# Patient Record
Sex: MALE | Race: WHITE | NOT HISPANIC OR LATINO | Employment: FULL TIME | URBAN - METROPOLITAN AREA
[De-identification: names, ages, dates, MRNs, and addresses within clinical notes are randomized per-mention and may not be internally consistent; named-entity substitution may affect disease eponyms.]

---

## 2023-05-01 PROBLEM — I10 ESSENTIAL HYPERTENSION: Status: ACTIVE | Noted: 2023-05-01

## 2023-05-01 RX ORDER — LOSARTAN POTASSIUM 25 MG/1
1 TABLET ORAL DAILY
COMMUNITY
Start: 2021-03-10 | End: 2023-10-04 | Stop reason: SDUPTHER

## 2023-05-01 RX ORDER — FLUTICASONE PROPIONATE 50 MCG
SPRAY, SUSPENSION (ML) NASAL
COMMUNITY

## 2023-05-01 RX ORDER — NAPROXEN 500 MG/1
1 TABLET ORAL
COMMUNITY
Start: 2022-04-14 | End: 2024-04-11 | Stop reason: ALTCHOICE

## 2023-09-19 DIAGNOSIS — I10 ESSENTIAL HYPERTENSION: ICD-10-CM

## 2023-09-19 RX ORDER — LOSARTAN POTASSIUM 25 MG/1
25 TABLET ORAL DAILY
Qty: 30 TABLET | Refills: 0 | Status: CANCELLED | OUTPATIENT
Start: 2023-09-19 | End: 2023-10-19

## 2023-10-04 ENCOUNTER — TELEPHONE (OUTPATIENT)
Dept: PRIMARY CARE | Facility: CLINIC | Age: 38
End: 2023-10-04
Payer: COMMERCIAL

## 2023-10-04 DIAGNOSIS — I10 ESSENTIAL HYPERTENSION: ICD-10-CM

## 2023-10-04 RX ORDER — LOSARTAN POTASSIUM 25 MG/1
25 TABLET ORAL DAILY
Qty: 7 TABLET | Refills: 0 | Status: SHIPPED | OUTPATIENT
Start: 2023-10-04 | End: 2023-10-11 | Stop reason: SDUPTHER

## 2023-10-10 NOTE — PROGRESS NOTES
"Subjective   Chief Complaint   Patient presents with    Follow-up     Dandre is here today for routine F/U and medication refill       Patient ID: Dandre Bangura is a 38 y.o. male who presents for Follow-up (Dandre is here today for routine F/U and medication refill).    HPI  Est 39 yo male presents today for f/u on HTN  LOV Nov 2022    Pt denies any acute c/o today  Requests flu vaccine and refill on Epi pen for bee sting allergy     #HTN  Rx Losartan 25 mg daily   BP dcrjn=114/83    Pt is due for FBW    Review of Systems  All 13 systems were reviewed and are within normal limits except positive and pertinent negative responses which are noted below or in HPI.    Objective   /83   Pulse 61   Ht 1.753 m (5' 9\")   Wt 83.5 kg (184 lb)   BMI 27.17 kg/m²        Physical Exam  Vitals reviewed.   Eyes:      Conjunctiva/sclera: Conjunctivae normal.   Cardiovascular:      Pulses: Normal pulses.      Heart sounds: Normal heart sounds.   Pulmonary:      Effort: Pulmonary effort is normal.      Breath sounds: Normal breath sounds.   Skin:     General: Skin is warm and dry.   Neurological:      Mental Status: He is alert.   Psychiatric:         Mood and Affect: Mood normal.         Thought Content: Thought content normal.         Assessment/Plan   Problem List Items Addressed This Visit             ICD-10-CM    Essential hypertension - Primary I10    Relevant Medications    losartan (Cozaar) 25 mg tablet    Other Relevant Orders    Basic Metabolic Panel    Lipid Panel     Other Visit Diagnoses         Codes    Need for vaccination     Z23    Relevant Orders    Flu vaccine (IIV4) age 6 months and greater, preservative free    Allergy to bee sting     Z91.030    Relevant Medications    EPINEPHrine (Epipen) 0.3 mg/0.3 mL injection syringe            "

## 2023-10-11 ENCOUNTER — LAB (OUTPATIENT)
Dept: LAB | Facility: LAB | Age: 38
End: 2023-10-11
Payer: COMMERCIAL

## 2023-10-11 ENCOUNTER — OFFICE VISIT (OUTPATIENT)
Dept: PRIMARY CARE | Facility: CLINIC | Age: 38
End: 2023-10-11
Payer: COMMERCIAL

## 2023-10-11 VITALS
WEIGHT: 184 LBS | BODY MASS INDEX: 27.25 KG/M2 | DIASTOLIC BLOOD PRESSURE: 83 MMHG | HEART RATE: 61 BPM | HEIGHT: 69 IN | SYSTOLIC BLOOD PRESSURE: 133 MMHG

## 2023-10-11 DIAGNOSIS — I10 ESSENTIAL HYPERTENSION: ICD-10-CM

## 2023-10-11 DIAGNOSIS — Z23 NEED FOR VACCINATION: ICD-10-CM

## 2023-10-11 DIAGNOSIS — Z91.030 ALLERGY TO BEE STING: ICD-10-CM

## 2023-10-11 DIAGNOSIS — I10 ESSENTIAL HYPERTENSION: Primary | ICD-10-CM

## 2023-10-11 LAB
ANION GAP SERPL CALC-SCNC: 11 MMOL/L (ref 10–20)
BUN SERPL-MCNC: 15 MG/DL (ref 6–23)
CALCIUM SERPL-MCNC: 9.5 MG/DL (ref 8.6–10.3)
CHLORIDE SERPL-SCNC: 103 MMOL/L (ref 98–107)
CHOLEST SERPL-MCNC: 231 MG/DL (ref 0–199)
CHOLESTEROL/HDL RATIO: 4.2
CO2 SERPL-SCNC: 29 MMOL/L (ref 21–32)
CREAT SERPL-MCNC: 1.09 MG/DL (ref 0.5–1.3)
GFR SERPL CREATININE-BSD FRML MDRD: 88 ML/MIN/1.73M*2
GLUCOSE SERPL-MCNC: 76 MG/DL (ref 74–99)
HDLC SERPL-MCNC: 54.7 MG/DL
LDLC SERPL CALC-MCNC: 147 MG/DL (ref 140–190)
NON HDL CHOLESTEROL: 176 MG/DL (ref 0–149)
POTASSIUM SERPL-SCNC: 4.3 MMOL/L (ref 3.5–5.3)
SODIUM SERPL-SCNC: 139 MMOL/L (ref 136–145)
TRIGL SERPL-MCNC: 145 MG/DL (ref 0–149)
VLDL: 29 MG/DL (ref 0–40)

## 2023-10-11 PROCEDURE — 3079F DIAST BP 80-89 MM HG: CPT | Performed by: NURSE PRACTITIONER

## 2023-10-11 PROCEDURE — 1036F TOBACCO NON-USER: CPT | Performed by: NURSE PRACTITIONER

## 2023-10-11 PROCEDURE — 80048 BASIC METABOLIC PNL TOTAL CA: CPT

## 2023-10-11 PROCEDURE — 3075F SYST BP GE 130 - 139MM HG: CPT | Performed by: NURSE PRACTITIONER

## 2023-10-11 PROCEDURE — 80061 LIPID PANEL: CPT

## 2023-10-11 PROCEDURE — 90686 IIV4 VACC NO PRSV 0.5 ML IM: CPT | Performed by: NURSE PRACTITIONER

## 2023-10-11 PROCEDURE — 99213 OFFICE O/P EST LOW 20 MIN: CPT | Performed by: NURSE PRACTITIONER

## 2023-10-11 PROCEDURE — 90471 IMMUNIZATION ADMIN: CPT | Performed by: NURSE PRACTITIONER

## 2023-10-11 PROCEDURE — 36415 COLL VENOUS BLD VENIPUNCTURE: CPT

## 2023-10-11 RX ORDER — EPINEPHRINE 0.3 MG/.3ML
1 INJECTION SUBCUTANEOUS ONCE AS NEEDED
Qty: 1 EACH | Refills: 0 | Status: SHIPPED | OUTPATIENT
Start: 2023-10-11

## 2023-10-11 RX ORDER — LOSARTAN POTASSIUM 25 MG/1
25 TABLET ORAL DAILY
Qty: 90 TABLET | Refills: 1 | Status: SHIPPED | OUTPATIENT
Start: 2023-10-11 | End: 2024-04-04

## 2023-10-11 NOTE — PATIENT INSTRUCTIONS
Thank you for seeing me today.  It was a pleasure to see you again!    #HTN  Your blood pressure should be </= 130/80.  Today your blood pressure was 133/83  C/W Losartan 25 mg daily    You should avoid foods that are high in sodium and saturated fats  Exercise daily for at least 30 minutes  minutes/week  Avoid stressful situations as this can increase your blood pressure  Take your medications as prescribed--do not skip doses  Obtain a BP monitor and check your blood pressure at home. Check it around the same time each day, at least 1 hour after taking your medication.  Record your blood pressure in a log and  bring your log with you to your next appointment.    Lab work ordered today.  Please have your blood drawn in the next 1-2 weeks.  You need to be FASTING for 12 hours prior to blood draw.  You may only have water.  Please contact your insurance company to ask about the best location to get blood drawn.  We will contact you with the results of your blood work and any necessary adjustments  to your plan of care, if you do not hear from us within 3-5 days of having your blood drawn, please call the office at [phone].    Flu vaccine today    RTC 6 MONTHS AND AS NEEDED

## 2024-04-04 DIAGNOSIS — I10 ESSENTIAL HYPERTENSION: ICD-10-CM

## 2024-04-04 RX ORDER — LOSARTAN POTASSIUM 25 MG/1
25 TABLET ORAL DAILY
Qty: 90 TABLET | Refills: 1 | Status: SHIPPED | OUTPATIENT
Start: 2024-04-04 | End: 2024-04-11 | Stop reason: SDUPTHER

## 2024-04-11 ENCOUNTER — OFFICE VISIT (OUTPATIENT)
Dept: PRIMARY CARE | Facility: CLINIC | Age: 39
End: 2024-04-11
Payer: COMMERCIAL

## 2024-04-11 VITALS
BODY MASS INDEX: 28.08 KG/M2 | HEART RATE: 65 BPM | WEIGHT: 189.6 LBS | HEIGHT: 69 IN | SYSTOLIC BLOOD PRESSURE: 132 MMHG | OXYGEN SATURATION: 96 % | DIASTOLIC BLOOD PRESSURE: 82 MMHG

## 2024-04-11 DIAGNOSIS — I10 ESSENTIAL HYPERTENSION: Primary | ICD-10-CM

## 2024-04-11 PROCEDURE — 99213 OFFICE O/P EST LOW 20 MIN: CPT | Performed by: NURSE PRACTITIONER

## 2024-04-11 PROCEDURE — 1036F TOBACCO NON-USER: CPT | Performed by: NURSE PRACTITIONER

## 2024-04-11 PROCEDURE — 3079F DIAST BP 80-89 MM HG: CPT | Performed by: NURSE PRACTITIONER

## 2024-04-11 PROCEDURE — 3075F SYST BP GE 130 - 139MM HG: CPT | Performed by: NURSE PRACTITIONER

## 2024-04-11 RX ORDER — LOSARTAN POTASSIUM 25 MG/1
25 TABLET ORAL DAILY
Qty: 90 TABLET | Refills: 1 | Status: SHIPPED | OUTPATIENT
Start: 2024-04-11

## 2024-04-11 NOTE — PATIENT INSTRUCTIONS
Thank you for seeing me today.  It was a pleasure to see you again!    Your blood pressure should be </= 130/80.  Today your blood pressure was 132/82  You should avoid foods that are high in sodium and saturated fats  Exercise daily for at least 30 minutes  minutes/week  Avoid stressful situations as this can increase your blood pressure  Take your medications as prescribed--do not skip doses  Obtain a BP monitor and check your blood pressure at home. Check it around the same time each day, at least 1 hour after taking your medication.  Record your blood pressure in a log and  bring your log with you to your next appointment.    RTC 6 MONTHS FOR CPE

## 2024-04-11 NOTE — PROGRESS NOTES
"Subjective   Chief Complaint   Patient presents with    Hypertension     Patient is coming in today for a follow up for his hypertension. He is currently taking Losartan 25mg daily. Patient reports no concerns. BP        Patient ID: Dandre Bangura is a 38 y.o. male who presents for Hypertension (Patient is coming in today for a follow up for his hypertension. He is currently taking Losartan 25mg daily. Patient reports no concerns. BP ).    HPI  Dandre is an est 37 yo male presenting today for 6 month f/u on HTN    Dx: HTN    Pt reports doing well  Ran a marathon last weekend    Had a small \"pimple\" on the back of his neck that he popped last week, no signs of infection on exam     Hypertension  Patient is here for follow-up of elevated blood pressure. He is exercising and is adherent to a low-salt diet. Blood pressure is well controlled at home. Cardiac symptoms: None. Patient denies chest pain, dyspnea, fatigue, and palpitations. Cardiovascular risk factors: none. Use of agents associated with hypertension: none. History of target organ damage: none.    Rx Losartan 25 mg daily   BP today: 132/82       Allergies   Allergen Reactions    Bee Venom Protein (Honey Bee) Unknown       Review of Systems  ROS was completed and all systems are negative with the exception of what was noted in the the HPI.       Objective   /82   Pulse 65   Ht 1.753 m (5' 9\")   Wt 86 kg (189 lb 9.6 oz)   SpO2 96%   BMI 28.00 kg/m²      Current Outpatient Medications   Medication Instructions    EPINEPHrine (EPIPEN) 0.3 mg, intramuscular, Once as needed, Call 911 after use.    fluticasone (Flonase Allergy Relief) 50 mcg/actuation nasal spray nasal    losartan (COZAAR) 25 mg, oral, Daily         Physical Exam  Vitals reviewed.   Cardiovascular:      Pulses: Normal pulses.      Heart sounds: Normal heart sounds.   Pulmonary:      Effort: Pulmonary effort is normal.   Neurological:      Mental Status: He is alert. "         Assessment/Plan   Problem List Items Addressed This Visit             ICD-10-CM    Essential hypertension - Primary I10     Rx Losartan 25 mg daily   BP today: 132/82  Nonsmoker  Does not check BP at home  Runner          Relevant Medications    losartan (Cozaar) 25 mg tablet

## 2024-10-16 NOTE — PROGRESS NOTES
"Subjective   Reason for Visit: Dandre Bangura is an 39 y.o. male here for a CPE     Chief Complaint   Patient presents with    Annual Exam     Dandre Bangura is a 39 y.o. male is here today for a CPE. Patient reports No questions or concerns at this time.         HPI  Dandre is a 38 yo est male presenting today for Annual CPE and 6 month f/u on HTN   LOV April 2024    Dx: HTN     Pt reports doing well today  No acute c/o  Does not want flu vaccine     #CPE   Occupation: FT AT [employer]     Do you take any herbs or supplements that were not prescribed by a doctor? MVI, VITAMIN D     Colon cancer screening: N/A due to age   PSA: N/A due to age     Fasting blood work: Due   HIV/HEP C Screening: Due   Last eye exam: 2023  Last dental Exam: 2024  Exercise: running/lifting   Mood: no concerns   Sleep: no concerns   Vaccines: UTD     #HTN  Dx'd 2022  Rx Losartan 25 mg daily  BP today: 128/78  FBW is due   Pt exercises/runs regularly     Health Maintenance Due   Topic Date Due    Yearly Adult Physical  Never done    HIV Screening  Never done    MMR Vaccines (1 of 1 - Standard series) Never done    Varicella Vaccines (1 of 2 - 13+ 2-dose series) Never done    Hepatitis C Screening  Never done    Diabetes Screening  Never done    Hepatitis B Vaccines (1 of 3 - 19+ 3-dose series) Never done    Influenza Vaccine (1) 09/01/2024    COVID-19 Vaccine (4 - 2024-25 season) 09/01/2024       Allergies   Allergen Reactions    Bee Venom Protein (Honey Bee) Unknown         Patient Care Team:  JOE Perera as PCP - General  JOE Perera as PCP - [facility]     Review of Systems  ROS was completed and all systems are negative with the exception of what was noted in the the HPI.       Objective   Vitals:  /78   Pulse 61   Ht 1.753 m (5' 9\")   Wt 85.4 kg (188 lb 3.2 oz)   SpO2 99%   BMI 27.79 kg/m²       Current Outpatient Medications   Medication Instructions    EPINEPHrine (EPIPEN) 0.3 mg, " intramuscular, Once as needed, Call 911 after use.    fluticasone (Flonase Allergy Relief) 50 mcg/actuation nasal spray Administer into affected nostril(s).    losartan (COZAAR) 25 mg, oral, Daily         Physical Exam  Vitals reviewed.   HENT:      Right Ear: Tympanic membrane normal.      Left Ear: Tympanic membrane normal.      Mouth/Throat:      Mouth: Mucous membranes are moist.   Eyes:      Conjunctiva/sclera: Conjunctivae normal.   Cardiovascular:      Pulses: Normal pulses.      Heart sounds: Normal heart sounds.   Pulmonary:      Effort: Pulmonary effort is normal.      Breath sounds: Normal breath sounds.   Abdominal:      General: Bowel sounds are normal.   Musculoskeletal:         General: Normal range of motion.   Skin:     General: Skin is warm and dry.   Neurological:      Mental Status: He is alert and oriented to person, place, and time.   Psychiatric:         Mood and Affect: Mood normal.           Assessment & Plan  Annual physical exam  - Counseled on healthy diet and regular exercise  - Fall avoidance information provided  - Personalized prevention plan provided   - Vaccines UTD   - FBW  ordered   - Pt agreeable to HIV/HEP C screening tests        Essential hypertension  Stable today: 128/78  Continue losartan 25 mg daily  FBW ordered    Orders:    Comprehensive Metabolic Panel; Future    Lipid Panel; Future    losartan (Cozaar) 25 mg tablet; Take 1 tablet (25 mg) by mouth once daily.    Vitamin D deficiency  Check vitamin D level     Orders:    Vitamin D 25-Hydroxy,Total (for eval of Vitamin D levels); Future    Encounter for hepatitis C screening test for low risk patient    Orders:    Hepatitis C Antibody; Future    Screening for HIV without presence of risk factors    Orders:    HIV 1/2 Antigen/Antibody Screen with Reflex to Confirmation; Future    Flu vaccine refused

## 2024-10-17 ENCOUNTER — APPOINTMENT (OUTPATIENT)
Dept: PRIMARY CARE | Facility: CLINIC | Age: 39
End: 2024-10-17
Payer: COMMERCIAL

## 2024-10-17 VITALS
DIASTOLIC BLOOD PRESSURE: 78 MMHG | HEART RATE: 61 BPM | HEIGHT: 69 IN | OXYGEN SATURATION: 99 % | WEIGHT: 188.2 LBS | BODY MASS INDEX: 27.88 KG/M2 | SYSTOLIC BLOOD PRESSURE: 128 MMHG

## 2024-10-17 DIAGNOSIS — I10 ESSENTIAL HYPERTENSION: ICD-10-CM

## 2024-10-17 DIAGNOSIS — Z11.59 ENCOUNTER FOR HEPATITIS C SCREENING TEST FOR LOW RISK PATIENT: ICD-10-CM

## 2024-10-17 DIAGNOSIS — E55.9 VITAMIN D DEFICIENCY: ICD-10-CM

## 2024-10-17 DIAGNOSIS — Z00.00 ANNUAL PHYSICAL EXAM: Primary | ICD-10-CM

## 2024-10-17 DIAGNOSIS — Z11.4 SCREENING FOR HIV WITHOUT PRESENCE OF RISK FACTORS: ICD-10-CM

## 2024-10-17 DIAGNOSIS — Z28.21 FLU VACCINE REFUSED: ICD-10-CM

## 2024-10-17 PROCEDURE — 3008F BODY MASS INDEX DOCD: CPT | Performed by: NURSE PRACTITIONER

## 2024-10-17 PROCEDURE — 3078F DIAST BP <80 MM HG: CPT | Performed by: NURSE PRACTITIONER

## 2024-10-17 PROCEDURE — 99396 PREV VISIT EST AGE 40-64: CPT | Performed by: NURSE PRACTITIONER

## 2024-10-17 PROCEDURE — 3074F SYST BP LT 130 MM HG: CPT | Performed by: NURSE PRACTITIONER

## 2024-10-17 PROCEDURE — 1036F TOBACCO NON-USER: CPT | Performed by: NURSE PRACTITIONER

## 2024-10-17 RX ORDER — LOSARTAN POTASSIUM 25 MG/1
25 TABLET ORAL DAILY
Qty: 90 TABLET | Refills: 3 | Status: SHIPPED | OUTPATIENT
Start: 2024-10-17

## 2024-10-17 NOTE — ASSESSMENT & PLAN NOTE
- Counseled on healthy diet and regular exercise  - Fall avoidance information provided  - Personalized prevention plan provided   - Vaccines UTD   - FBW  ordered   - Pt agreeable to HIV/HEP C screening tests

## 2024-10-17 NOTE — PATIENT INSTRUCTIONS
Thank you for seeing me today Dandre Bangura, it was a pleasure to see you again!    Today we did your Annual Physical Exam and discussed the following:     Continue medication as prescribed    Lab work ordered today.  Please have your blood drawn in the next 1-2 weeks.  You need to be FASTING for 12 hours prior to blood draw.  You may only have water.  Please contact your insurance company to ask about the best location to get blood drawn.  We will contact you with the results of your blood work and any necessary adjustments  to your plan of care, if you do not hear from us within 3-5 days of having your blood drawn, please call the office at [phone].    For assistance with scheduling referrals or consultations, please call [phone] or [phone].    For laboratory, radiology, and other tests, please call [phone] ([phone] for pediatrics).   If you do not get results within 7-10 days, or you have any questions or concerns, please send a message, call the office ([phone]), or return to the office for a follow-up appointment.     For acute/sick visits, if you are unable to get an office visit, you can do a  On Demand Virtual Visit that is accessible via your My Chart account.  For emergencies, call 9-1-1 or go to the nearest Emergency Department.     Please schedule additional appointment(s) to address concern(s) not addressed today.    Please review prescription inserts and published information for possible adverse effects of all medications.     In general, results are discussed over the phone or via  [employer].     You can see your health information, review clinical summaries from office visits & test results online when you follow your health with MY  Chart, a personal health record.   To sign up go to [email or web address].   If you need assistance with signing up or trouble getting into your account call getbetter! Patient Line 24/7 at [phone]     [facility] ANNUALLY AND AS  NEEDED     Have a nice day!  Nieves Black

## 2024-10-17 NOTE — ASSESSMENT & PLAN NOTE
Check vitamin D level     Orders:    Vitamin D 25-Hydroxy,Total (for eval of Vitamin D levels); Future

## 2024-10-17 NOTE — ASSESSMENT & PLAN NOTE
Stable today: 128/78  Continue losartan 25 mg daily  FBW ordered    Orders:    Comprehensive Metabolic Panel; Future    Lipid Panel; Future    losartan (Cozaar) 25 mg tablet; Take 1 tablet (25 mg) by mouth once daily.

## 2025-01-17 ENCOUNTER — HOSPITAL ENCOUNTER (OUTPATIENT)
Dept: RADIOLOGY | Facility: HOSPITAL | Age: 40
Discharge: HOME | End: 2025-01-17
Payer: COMMERCIAL

## 2025-01-17 ENCOUNTER — OFFICE VISIT (OUTPATIENT)
Dept: ORTHOPEDIC SURGERY | Facility: HOSPITAL | Age: 40
End: 2025-01-17
Payer: COMMERCIAL

## 2025-01-17 DIAGNOSIS — M25.811 SHOULDER IMPINGEMENT, RIGHT: ICD-10-CM

## 2025-01-17 DIAGNOSIS — M24.111 LABRAL TEAR OF SHOULDER, DEGENERATIVE, RIGHT: Primary | ICD-10-CM

## 2025-01-17 DIAGNOSIS — M75.81 RIGHT ROTATOR CUFF TENDONITIS: ICD-10-CM

## 2025-01-17 DIAGNOSIS — M75.21 BICEPS TENDONITIS ON RIGHT: ICD-10-CM

## 2025-01-17 PROCEDURE — 99214 OFFICE O/P EST MOD 30 MIN: CPT | Performed by: FAMILY MEDICINE

## 2025-01-17 PROCEDURE — 73030 X-RAY EXAM OF SHOULDER: CPT | Mod: RT

## 2025-01-17 RX ORDER — MELOXICAM 15 MG/1
15 TABLET ORAL DAILY
Qty: 30 TABLET | Refills: 1 | Status: SHIPPED | OUTPATIENT
Start: 2025-01-17 | End: 2025-03-18
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
## 2025-02-03 ENCOUNTER — HOSPITAL ENCOUNTER (OUTPATIENT)
Dept: RADIOLOGY | Facility: CLINIC | Age: 40
Discharge: HOME | End: 2025-02-03
Payer: COMMERCIAL

## 2025-02-03 DIAGNOSIS — M75.21 BICEPS TENDONITIS ON RIGHT: ICD-10-CM

## 2025-02-03 DIAGNOSIS — M24.111 LABRAL TEAR OF SHOULDER, DEGENERATIVE, RIGHT: ICD-10-CM

## 2025-02-03 DIAGNOSIS — M25.811 SHOULDER IMPINGEMENT, RIGHT: ICD-10-CM

## 2025-02-03 DIAGNOSIS — M75.81 RIGHT ROTATOR CUFF TENDONITIS: ICD-10-CM

## 2025-02-03 PROCEDURE — 73221 MRI JOINT UPR EXTREM W/O DYE: CPT | Mod: RT

## 2025-02-03 PROCEDURE — 73221 MRI JOINT UPR EXTREM W/O DYE: CPT | Mod: RIGHT SIDE | Performed by: RADIOLOGY

## 2025-02-07 ENCOUNTER — OFFICE VISIT (OUTPATIENT)
Dept: ORTHOPEDIC SURGERY | Facility: HOSPITAL | Age: 40
End: 2025-02-07
Payer: COMMERCIAL

## 2025-02-07 DIAGNOSIS — S46.011A TRAUMATIC INCOMPLETE TEAR OF RIGHT ROTATOR CUFF, INITIAL ENCOUNTER: Primary | ICD-10-CM

## 2025-02-07 PROCEDURE — 99213 OFFICE O/P EST LOW 20 MIN: CPT | Performed by: FAMILY MEDICINE
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
## 2025-02-28 ENCOUNTER — OFFICE VISIT (OUTPATIENT)
Dept: ORTHOPEDIC SURGERY | Facility: HOSPITAL | Age: 40
End: 2025-02-28
Payer: COMMERCIAL

## 2025-02-28 DIAGNOSIS — S46.011A TRAUMATIC INCOMPLETE TEAR OF RIGHT ROTATOR CUFF, INITIAL ENCOUNTER: ICD-10-CM

## 2025-02-28 PROCEDURE — 99204 OFFICE O/P NEW MOD 45 MIN: CPT | Performed by: STUDENT IN AN ORGANIZED HEALTH CARE EDUCATION/TRAINING PROGRAM

## 2025-02-28 PROCEDURE — L3670 SO ACRO/CLAV CAN WEB PRE OTS: HCPCS | Performed by: STUDENT IN AN ORGANIZED HEALTH CARE EDUCATION/TRAINING PROGRAM

## 2025-02-28 PROCEDURE — 99214 OFFICE O/P EST MOD 30 MIN: CPT | Performed by: STUDENT IN AN ORGANIZED HEALTH CARE EDUCATION/TRAINING PROGRAM
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
## 2025-03-12 ENCOUNTER — PREP FOR PROCEDURE (OUTPATIENT)
Dept: ORTHOPEDIC SURGERY | Facility: CLINIC | Age: 40
End: 2025-03-12
Payer: COMMERCIAL

## 2025-03-12 DIAGNOSIS — M75.41 SUBACROMIAL IMPINGEMENT OF RIGHT SHOULDER: ICD-10-CM

## 2025-03-12 DIAGNOSIS — S46.011A TRAUMATIC COMPLETE TEAR OF RIGHT ROTATOR CUFF, INITIAL ENCOUNTER: Primary | ICD-10-CM

## 2025-03-12 DIAGNOSIS — M75.21 BICEPS TENDINITIS OF RIGHT SHOULDER: ICD-10-CM

## 2025-03-15 DIAGNOSIS — M25.811 SHOULDER IMPINGEMENT, RIGHT: ICD-10-CM

## 2025-03-15 DIAGNOSIS — M75.21 BICEPS TENDONITIS ON RIGHT: ICD-10-CM

## 2025-03-15 DIAGNOSIS — M75.81 RIGHT ROTATOR CUFF TENDONITIS: ICD-10-CM

## 2025-03-15 DIAGNOSIS — M24.111 LABRAL TEAR OF SHOULDER, DEGENERATIVE, RIGHT: ICD-10-CM

## 2025-03-17 ENCOUNTER — LAB (OUTPATIENT)
Dept: LAB | Facility: HOSPITAL | Age: 40
End: 2025-03-17
Payer: COMMERCIAL

## 2025-03-17 ENCOUNTER — PRE-ADMISSION TESTING (OUTPATIENT)
Dept: PREADMISSION TESTING | Facility: HOSPITAL | Age: 40
End: 2025-03-17
Payer: COMMERCIAL

## 2025-03-17 VITALS
HEIGHT: 69 IN | HEART RATE: 66 BPM | SYSTOLIC BLOOD PRESSURE: 144 MMHG | TEMPERATURE: 98.2 F | WEIGHT: 197.31 LBS | BODY MASS INDEX: 29.22 KG/M2 | OXYGEN SATURATION: 100 % | RESPIRATION RATE: 18 BRPM | DIASTOLIC BLOOD PRESSURE: 93 MMHG

## 2025-03-17 DIAGNOSIS — Z01.818 PREOPERATIVE TESTING: Primary | ICD-10-CM

## 2025-03-17 DIAGNOSIS — Z01.818 ENCOUNTER FOR OTHER PREPROCEDURAL EXAMINATION: Primary | ICD-10-CM

## 2025-03-17 LAB
ANION GAP SERPL CALCULATED.3IONS-SCNC: 10 MMOL/L (ref 10–20)
BUN SERPL-MCNC: 14 MG/DL (ref 6–23)
CALCIUM SERPL-MCNC: 9.4 MG/DL (ref 8.6–10.3)
CHLORIDE SERPL-SCNC: 105 MMOL/L (ref 98–107)
CO2 SERPL-SCNC: 28 MMOL/L (ref 21–32)
CREAT SERPL-MCNC: 1.07 MG/DL (ref 0.5–1.3)
EGFRCR SERPLBLD CKD-EPI 2021: 89 ML/MIN/1.73M*2
ERYTHROCYTE [DISTWIDTH] IN BLOOD BY AUTOMATED COUNT: 12.7 % (ref 11.5–14.5)
GLUCOSE SERPL-MCNC: 88 MG/DL (ref 74–99)
HCT VFR BLD AUTO: 41 % (ref 41–52)
HGB BLD-MCNC: 14.2 G/DL (ref 13.5–17.5)
MCH RBC QN AUTO: 30.5 PG (ref 26–34)
MCHC RBC AUTO-ENTMCNC: 34.6 G/DL (ref 32–36)
MCV RBC AUTO: 88 FL (ref 80–100)
NRBC BLD-RTO: 0 /100 WBCS (ref 0–0)
PLATELET # BLD AUTO: 200 X10*3/UL (ref 150–450)
POTASSIUM SERPL-SCNC: 4.4 MMOL/L (ref 3.5–5.3)
RBC # BLD AUTO: 4.65 X10*6/UL (ref 4.5–5.9)
SODIUM SERPL-SCNC: 139 MMOL/L (ref 136–145)
WBC # BLD AUTO: 5.1 X10*3/UL (ref 4.4–11.3)

## 2025-03-17 PROCEDURE — 99204 OFFICE O/P NEW MOD 45 MIN: CPT | Performed by: NURSE PRACTITIONER

## 2025-03-17 PROCEDURE — 87081 CULTURE SCREEN ONLY: CPT | Mod: WESLAB

## 2025-03-17 PROCEDURE — 80048 BASIC METABOLIC PNL TOTAL CA: CPT

## 2025-03-17 PROCEDURE — 85027 COMPLETE CBC AUTOMATED: CPT

## 2025-03-17 RX ORDER — CHLORHEXIDINE GLUCONATE ORAL RINSE 1.2 MG/ML
SOLUTION DENTAL
Qty: 473 ML | Refills: 0 | Status: SHIPPED | OUTPATIENT
Start: 2025-03-17

## 2025-03-17 RX ORDER — MELOXICAM 15 MG/1
15 TABLET ORAL DAILY
Qty: 30 TABLET | Refills: 1 | Status: SHIPPED | OUTPATIENT
Start: 2025-03-17

## 2025-03-17 ASSESSMENT — DUKE ACTIVITY SCORE INDEX (DASI)
CAN YOU DO LIGHT WORK AROUND THE HOUSE LIKE DUSTING OR WASHING DISHES: YES
CAN YOU DO YARD WORK LIKE RAKING LEAVES, WEEDING OR PUSHING A MOWER: YES
CAN YOU CLIMB A FLIGHT OF STAIRS OR WALK UP A HILL: YES
CAN YOU DO MODERATE WORK AROUND THE HOUSE LIKE VACUUMING, SWEEPING FLOORS OR CARRYING GROCERIES: YES
CAN YOU PARTICIPATE IN STRENOUS SPORTS LIKE SWIMMING, SINGLES TENNIS, FOOTBALL, BASKETBALL, OR SKIING: YES
CAN YOU PARTICIPATE IN MODERATE RECREATIONAL ACTIVITIES LIKE GOLF, BOWLING, DANCING, DOUBLES TENNIS OR THROWING A BASEBALL OR FOOTBALL: YES
DASI METS SCORE: 9.9
CAN YOU WALK INDOORS, SUCH AS AROUND YOUR HOUSE: YES
CAN YOU TAKE CARE OF YOURSELF (EAT, DRESS, BATHE, OR USE TOILET): YES
CAN YOU RUN A SHORT DISTANCE: YES
CAN YOU DO HEAVY WORK AROUND THE HOUSE LIKE SCRUBBING FLOORS OR LIFTING AND MOVING HEAVY FURNITURE: YES
CAN YOU WALK A BLOCK OR TWO ON LEVEL GROUND: YES
TOTAL_SCORE: 58.2
CAN YOU HAVE SEXUAL RELATIONS: YES

## 2025-03-17 ASSESSMENT — ENCOUNTER SYMPTOMS
NEUROLOGICAL NEGATIVE: 1
GASTROINTESTINAL NEGATIVE: 1
ACTIVITY CHANGE: 1
CARDIOVASCULAR NEGATIVE: 1
EYES NEGATIVE: 1
RESPIRATORY NEGATIVE: 1

## 2025-03-17 ASSESSMENT — PAIN SCALES - GENERAL: PAINLEVEL_OUTOF10: 2

## 2025-03-17 ASSESSMENT — PAIN DESCRIPTION - DESCRIPTORS: DESCRIPTORS: ACHING

## 2025-03-17 ASSESSMENT — PAIN - FUNCTIONAL ASSESSMENT: PAIN_FUNCTIONAL_ASSESSMENT: 0-10

## 2025-03-17 NOTE — PREPROCEDURE INSTRUCTIONS
Medication List            Accurate as of March 17, 2025  7:55 AM. Always use your most recent med list.                chlorhexidine 0.12 % solution  Commonly known as: Peridex  Use cap to measure 15 mL.  Swish/gargle mouthwash for at least 30 seconds.  Do not swallow.  Use night before surgery after brushing teeth and morning of surgery after brushing teeth.  Medication Adjustments for Surgery: Take/Use as prescribed     EPINEPHrine 0.3 mg/0.3 mL injection syringe  Commonly known as: Epipen  Inject 0.3 mL (0.3 mg) into the shoulder, thigh, or buttocks 1 time if needed for anaphylaxis for up to 1 dose. Call 911 after use.  Medication Adjustments for Surgery: Take on the morning of surgery     Flonase Allergy Relief 50 mcg/actuation nasal spray  Generic drug: fluticasone  Medication Adjustments for Surgery: Take on the morning of surgery     losartan 25 mg tablet  Commonly known as: Cozaar  Take 1 tablet (25 mg) by mouth once daily.  Medication Adjustments for Surgery: Take last dose 1 day (24 hours) before surgery     meloxicam 15 mg tablet  Commonly known as: Mobic  Take 1 tablet (15 mg) by mouth once daily.  Additional Medication Adjustments for Surgery: Take last dose 7 days before surgery                              NPO Instructions:    Do not eat any food after midnight the night before your surgery/procedure.  You may have clear liquids until TWO hours before surgery/procedure. This includes water, black tea/coffee, (no milk or cream) apple juice and electrolyte drinks (Gatorade).    Additional Instructions:      Home Preoperative Antibacterial Shower    What is a home preoperative antibacterial shower?  This shower is a way of cleaning the skin with a germ killing solution before surgery. The solution contains chlorhexidine, commonly known as CHG. CHG is a skin cleanser with germ killing ability. Let your doctor know if you are allergic to chlorhexidine.      Why do I need to take a preoperative  antibacterial shower?  Skin is not sterile. It is best to try to make your skin as free of germs as possible before surgery. Proper cleansing with a germ killing soap before surgery can lower the number of germs on your skin. This helps to reduce the risk of infection at the surgical site. Following the instructions listed below will help you prepare your skin for surgery.    How do I use the solution?      Steps: Begin using your CHG soap 6 DAYS BEFORE your scheduled surgery on __________________________________________________.  First, wash and rinse your hair using the CHG soap. Keep CHG away soap away from ear canals and eyes.  Rinse completely, do not condition. Hair extensions should be removed.  Wash your face with your normal soap and rinse.  Apply the CHG solution to a clean wet washcloth. Turn the water off or move away from the water spray to avoid premature rinsing of the CHG soap as you are applying.  Firmly lather your entire body from neck down. Do not use on face.  Pay special attention to the areas(s) where your incision(s) will be located unless they are on your face.  Avoid scrubbing your skin too hard.  The important point is to have the CHG soap sit on your skin for 3 minutes.  DO NOT wash with regular soap after you have used the CHG soap solution.  Pat yourself dry with a clean, freshly laundered towel.  DO NOT apply powders, deodorants or lotions.  Dress in clean, freshly laundered night clothes.  Be sure to sleep with clean, freshly laundered sheets.  Be aware that CHG will cause stains on fabrics; if you wash them with bleach after use. Rinse your washcloth and other linens that have contact with CHG completely. Use only non-chlorine detergents to launder the items used.  The morning of surgery is the fifth day. Repeat the above steps and dress in clean comfortable clothing.      Who should I call if I have any questions regarding the use of CHG soap?  Call the [facility]  [facility] Per[facility] at [phone] if you have any questions. Patient Information: Oral/Dental Rinse    What is oral/dental rinse?  It is a mouthwash. It is a way of cleaning the mouth with a germ killing solution before your surgery. The solution contains chlorhexidine, commonly know as CHG.  It is used inside the mouth to kill bacteria known as Staphylococcus aureus.  Let your doctor know if you are allergic to chlorhexidine.    Why do I need to use CHG oral/dental rinse?  The CHG oral/dental rinse helps to kill bacteria in your mouth known as Staphylococcus aureus. This reduces the risk of infection at the surgical site.    Using your CHG oral/dental rinse.  STEPS: use your CHG oral/dental rinse after you brush your teeth the night before (at bedtime) and the morning of your surgery. Follow the directions on your prescription label.  Use the cap on the container to measure 15ml (fill cap to fill line)  Swish (gargle if you can) the mouthwash in your mouth for at least 30 seconds, (do not swallow) spit out.  After you use your CHG rinse, do not rinse your mouth with water, drink or eat. Please refer to prescription label for the appropriate time to resume oral intake.    What side effects might I have using the CHG oral/dental rinse?  CHG rinse will stick to the plaque on the teeth. Brush and floss just before use. Teeth brushing will help avoid staining of plaque during use.    Who should I contact if I have questions about the CHG oral/dental rinse?  Please call [facility], Center for Perioperative Medicine at [phone] if you have any questions.PAT DISCHARGE INSTRUCTIONS    Please call the Same Day Surgery (SDS) Department of the hospital where your procedure will be performed after 2:00 PM the day before your surgery. If you are scheduled on a Monday, or a Tuesday following a Monday holiday, you will need to call on the last  business day prior to your surgery.    [facility]  [address] [place] [address] [phone]  [facility]  [address] [facility], [id number] [phone]  [facility]  [address] [place] [address] [phone]    Please let your surgeon know if:      You develop any open sores, shingles, burning or painful urination as these may increase your risk of an infection.   You no longer wish to have the surgery.   Any other personal circumstances change that may lead to the need to cancel or defer this surgery-such as being sick or getting admitted to any hospital within one week of your planned procedure.    Your contact details change, such as a change of address or phone number.    Starting now:     Please DO NOT drink alcohol or smoke for 24 hours before surgery. It is well known that quitting smoking can make a huge difference to your health and recovery from surgery. The longer you abstain from smoking, the better your chances of a healthy recovery. If you need help with quitting, call 1-800-QUIT-NOW to be connected to a trained counselor who will discuss the best methods to help you quit.     Before your surgery:    Please stop all supplements 7 days prior to surgery. Or as directed by your surgeon.   Please stop taking NSAID pain medicine such as Advil and Motrin 7 days before surgery.    If you develop any fever, cough, cold, rashes, cuts, scratches, scrapes, urinary symptoms or infection anywhere on your body (including teeth and gums) prior to surgery, please call your surgeon’s office as soon as possible. This may require treatment to reduce the chance of cancellation on the day of surgery.    The day before your surgery:   DIET- Please follow the diet instructions at the top of your packet.   Get a good night’s rest.  Use the special soap for  bathing if you have been instructed to use one.    Scheduled surgery times may change and you will be notified if this occurs - please check your personal voicemail for any updates.     On the morning of surgery:   Wear comfortable, loose fitting clothes which open in the front. Please do not wear moisturizers, creams, lotions, makeup or perfume.    Please bring with you to surgery:   Photo ID and insurance card   Current list of medicines and allergies   Pacemaker/ Defibrillator/Heart stent cards   CPAP machine and mask    Slings/ splints/ crutches   A copy of your complete advanced directive/DHPOA.    Please do NOT bring with you to surgery:   All jewelry and valuables should be left at home.   Prosthetic devices such as contact lenses, hearing aids, dentures, eyelash extensions, hairpins and body piercings must be removed prior to going in to the surgical suite.    After outpatient surgery:   A responsible adult MUST accompany you at the time of discharge and stay with you for 24 hours after your surgery. You may NOT drive yourself home after surgery.    Do not drive, operate machinery, make critical decisions or do activities that require co-ordination or balance until after a night’s sleep.   Do not drink alcoholic beverages for 24 hours.   Instructions for resuming your medications will be provided by your surgeon.    CALL YOUR DOCTOR AFTER SURGERY IF YOU HAVE:     Chills and/or a fever of 101° F or higher.    Redness, swelling, pus or drainage from your surgical wound or a bad smell from the wound.    Lightheadedness, fainting or confusion.    Persistent vomiting (throwing up) and are not able to eat or drink for 12 hours.    Three or more loose, watery bowel movements in 24 hours (diarrhea).   Difficulty or pain while urinating( after non-urological surgery)    Pain and swelling in your legs, especially if it is only on one side.    Difficulty breathing or are breathing faster than normal.    Any new  concerning symptoms.

## 2025-03-17 NOTE — H&P (VIEW-ONLY)
CPM/PAT Evaluation       Name: Dandre Bangura (Dandre Bangura)  /Age: 1985/39 y.o.     In-Person       Chief Complaint: Traumatic complete tear of right rotator cuff, right shoulder subacromial impingement, right shoulder biceps tendinitis     HPI  A 39-year-old male with traumatic complete tear of right rotator cuff, right shoulder subacromial impingement, right shoulder biceps tendinitis.  History of right shoulder soreness over the years s/p previous right shoulder injury and surgery in .  Right shoulder pain, weakness, and decreased range of motion has become more severe in the past several months after splitting wood.  He has had progressive symptoms of right shoulder pain and weakness especially with reaching or lifting interfering with physical activities/exercises especially with weight training.  An MRI of the right shoulder on 2/3/2025 showed:  Full-thickness supraspinatus tendon tear anterior tendon from the footprint measuring 7 mm in the AP dimension with retracted free edge of the tendon measuring 6 mm from the footprint, Mild -to -moderate glenohumeral joint osteoarthritis with  subchondral cystic change and osteophytosis demonstrated as described above with thinning of the articular cartilage in particular posterior osseous glenoid. There is a truncated appearance of the posterior glenoid labrum, Mild subacromial subdeltoid bursitis.Denies fever, chills, chest pain, shortness of breath, syncope, or right upper extremity numbness.  He is scheduled for right rotator cuff repair, right subacromial decompression, right subpectoral biceps tenodesis.        Past Medical History:   Diagnosis Date    Cutaneous abscess of back (any part, except buttock) 2020    Back abscess    Pain in unspecified knee     Knee pain    Personal history of diseases of the skin and subcutaneous tissue 2020    History of sebaceous cyst    Strain of unspecified muscle(s) and tendon(s) at lower leg  level, left leg, initial encounter 08/28/2020    Knee strain, left, initial encounter       Past Surgical History:   Procedure Laterality Date    ROTATOR CUFF REPAIR Bilateral     2002/2003         Allergies   Allergen Reactions    Bee Venom Protein (Honey Bee) Unknown       Current Outpatient Medications   Medication Sig Dispense Refill    EPINEPHrine (Epipen) 0.3 mg/0.3 mL injection syringe Inject 0.3 mL (0.3 mg) into the shoulder, thigh, or buttocks 1 time if needed for anaphylaxis for up to 1 dose. Call 911 after use. 1 each 0    fluticasone (Flonase Allergy Relief) 50 mcg/actuation nasal spray Administer into affected nostril(s).      losartan (Cozaar) 25 mg tablet Take 1 tablet (25 mg) by mouth once daily. 90 tablet 3    meloxicam (Mobic) 15 mg tablet Take 1 tablet (15 mg) by mouth once daily. 30 tablet 1     No current facility-administered medications for this visit.          Review of Systems   Constitutional:  Positive for activity change.   HENT: Negative.     Eyes: Negative.    Respiratory: Negative.     Cardiovascular: Negative.    Gastrointestinal: Negative.    Genitourinary: Negative.    Musculoskeletal:         Right shoulder pain, weakness and decreased range of motion   Skin: Negative.    Neurological: Negative.         Physical Exam  Vitals reviewed: Elevated BP-patient advised to monitor BP, take antihypertensive medications as prescribed, follow-up with PCP.   HENT:      Head: Normocephalic and atraumatic.      Mouth/Throat:      Mouth: Mucous membranes are moist.   Eyes:      Pupils: Pupils are equal, round, and reactive to light.   Cardiovascular:      Rate and Rhythm: Normal rate and regular rhythm.   Pulmonary:      Effort: Pulmonary effort is normal.      Breath sounds: Normal breath sounds.   Abdominal:      Palpations: Abdomen is soft.   Musculoskeletal:      Cervical back: Normal range of motion.      Comments: Right shoulder pain and decreased range of motion   Skin:     General: Skin  "is warm and dry.   Neurological:      Mental Status: He is alert and oriented to person, place, and time.   Psychiatric:         Mood and Affect: Mood normal.          PAT AIRWAY:   Airway:     Mallampati::  II    Neck ROM::  Full  normal        BP (!) 144/93   Pulse 66   Temp 36.8 °C (98.2 °F) (Temporal)   Resp 18   Ht 1.753 m (5' 9\")   Wt 89.5 kg (197 lb 5 oz)   SpO2 100%   BMI 29.14 kg/m²       Stop Bang Score 2   CHADS: 2.8%  DASI risk score: 58.2  METS: 9.9  SAMREEN: 0.1%  RCRI:0.4%  ASA: II  ARISCAT:1.6% score 16  PAT orders CBC, BMP, MRSA    Assessment and Plan:     Traumatic complete tear of right rotator cuff, right shoulder subacromial impingement, right shoulder biceps tendinitis Plan: Right rotator cuff repair, right subacromial decompression, right subpectoral biceps tenodesis  Hypertension managed with losartan  Joint pain taking meloxicam  BMI 27.79        "

## 2025-03-18 LAB — STAPHYLOCOCCUS SPEC CULT: NORMAL

## 2025-03-26 DIAGNOSIS — Z98.890 S/P RIGHT ROTATOR CUFF REPAIR: ICD-10-CM

## 2025-03-31 ENCOUNTER — ANESTHESIA (OUTPATIENT)
Dept: OPERATING ROOM | Facility: HOSPITAL | Age: 40
End: 2025-03-31
Payer: COMMERCIAL

## 2025-03-31 ENCOUNTER — ANESTHESIA EVENT (OUTPATIENT)
Dept: OPERATING ROOM | Facility: HOSPITAL | Age: 40
End: 2025-03-31
Payer: COMMERCIAL

## 2025-03-31 ENCOUNTER — HOSPITAL ENCOUNTER (OUTPATIENT)
Facility: HOSPITAL | Age: 40
Setting detail: OUTPATIENT SURGERY
Discharge: HOME | End: 2025-03-31
Attending: STUDENT IN AN ORGANIZED HEALTH CARE EDUCATION/TRAINING PROGRAM | Admitting: STUDENT IN AN ORGANIZED HEALTH CARE EDUCATION/TRAINING PROGRAM
Payer: COMMERCIAL

## 2025-03-31 VITALS
HEART RATE: 67 BPM | WEIGHT: 191.58 LBS | BODY MASS INDEX: 28.29 KG/M2 | RESPIRATION RATE: 16 BRPM | TEMPERATURE: 97.2 F | OXYGEN SATURATION: 96 % | DIASTOLIC BLOOD PRESSURE: 88 MMHG | SYSTOLIC BLOOD PRESSURE: 140 MMHG

## 2025-03-31 DIAGNOSIS — M75.21 BICEPS TENDINITIS OF RIGHT SHOULDER: Primary | ICD-10-CM

## 2025-03-31 DIAGNOSIS — M75.41 SUBACROMIAL IMPINGEMENT OF RIGHT SHOULDER: ICD-10-CM

## 2025-03-31 PROCEDURE — C1713 ANCHOR/SCREW BN/BN,TIS/BN: HCPCS | Performed by: STUDENT IN AN ORGANIZED HEALTH CARE EDUCATION/TRAINING PROGRAM

## 2025-03-31 PROCEDURE — 2720000007 HC OR 272 NO HCPCS: Performed by: STUDENT IN AN ORGANIZED HEALTH CARE EDUCATION/TRAINING PROGRAM

## 2025-03-31 PROCEDURE — 2780000003 HC OR 278 NO HCPCS: Performed by: STUDENT IN AN ORGANIZED HEALTH CARE EDUCATION/TRAINING PROGRAM

## 2025-03-31 PROCEDURE — 2500000005 HC RX 250 GENERAL PHARMACY W/O HCPCS: Performed by: STUDENT IN AN ORGANIZED HEALTH CARE EDUCATION/TRAINING PROGRAM

## 2025-03-31 PROCEDURE — 7100000009 HC PHASE TWO TIME - INITIAL BASE CHARGE: Performed by: STUDENT IN AN ORGANIZED HEALTH CARE EDUCATION/TRAINING PROGRAM

## 2025-03-31 PROCEDURE — A4649 SURGICAL SUPPLIES: HCPCS | Performed by: STUDENT IN AN ORGANIZED HEALTH CARE EDUCATION/TRAINING PROGRAM

## 2025-03-31 PROCEDURE — 2500000004 HC RX 250 GENERAL PHARMACY W/ HCPCS (ALT 636 FOR OP/ED)

## 2025-03-31 PROCEDURE — 3700000001 HC GENERAL ANESTHESIA TIME - INITIAL BASE CHARGE: Performed by: STUDENT IN AN ORGANIZED HEALTH CARE EDUCATION/TRAINING PROGRAM

## 2025-03-31 PROCEDURE — 7100000002 HC RECOVERY ROOM TIME - EACH INCREMENTAL 1 MINUTE: Performed by: STUDENT IN AN ORGANIZED HEALTH CARE EDUCATION/TRAINING PROGRAM

## 2025-03-31 PROCEDURE — 64415 NJX AA&/STRD BRCH PLXS IMG: CPT | Performed by: STUDENT IN AN ORGANIZED HEALTH CARE EDUCATION/TRAINING PROGRAM

## 2025-03-31 PROCEDURE — 3600000009 HC OR TIME - EACH INCREMENTAL 1 MINUTE - PROCEDURE LEVEL FOUR: Performed by: STUDENT IN AN ORGANIZED HEALTH CARE EDUCATION/TRAINING PROGRAM

## 2025-03-31 PROCEDURE — 29823 SHO ARTHRS SRG XTNSV DBRDMT: CPT | Performed by: STUDENT IN AN ORGANIZED HEALTH CARE EDUCATION/TRAINING PROGRAM

## 2025-03-31 PROCEDURE — 23430 REPAIR BICEPS TENDON: CPT | Performed by: STUDENT IN AN ORGANIZED HEALTH CARE EDUCATION/TRAINING PROGRAM

## 2025-03-31 PROCEDURE — 3700000002 HC GENERAL ANESTHESIA TIME - EACH INCREMENTAL 1 MINUTE: Performed by: STUDENT IN AN ORGANIZED HEALTH CARE EDUCATION/TRAINING PROGRAM

## 2025-03-31 PROCEDURE — A23430 PR REPAIR BICEPS LONG TENDON

## 2025-03-31 PROCEDURE — 2500000004 HC RX 250 GENERAL PHARMACY W/ HCPCS (ALT 636 FOR OP/ED): Performed by: STUDENT IN AN ORGANIZED HEALTH CARE EDUCATION/TRAINING PROGRAM

## 2025-03-31 PROCEDURE — 7100000010 HC PHASE TWO TIME - EACH INCREMENTAL 1 MINUTE: Performed by: STUDENT IN AN ORGANIZED HEALTH CARE EDUCATION/TRAINING PROGRAM

## 2025-03-31 PROCEDURE — 7100000001 HC RECOVERY ROOM TIME - INITIAL BASE CHARGE: Performed by: STUDENT IN AN ORGANIZED HEALTH CARE EDUCATION/TRAINING PROGRAM

## 2025-03-31 PROCEDURE — A23430 PR REPAIR BICEPS LONG TENDON: Performed by: ANESTHESIOLOGY

## 2025-03-31 PROCEDURE — 3600000004 HC OR TIME - INITIAL BASE CHARGE - PROCEDURE LEVEL FOUR: Performed by: STUDENT IN AN ORGANIZED HEALTH CARE EDUCATION/TRAINING PROGRAM

## 2025-03-31 DEVICE — IMPLANT SYSTEM, FIBERTAK, W/ BICEPS ANCHOR: Type: IMPLANTABLE DEVICE | Site: SHOULDER | Status: FUNCTIONAL

## 2025-03-31 RX ORDER — OXYCODONE HYDROCHLORIDE 5 MG/1
5 TABLET ORAL EVERY 4 HOURS PRN
Status: DISCONTINUED | OUTPATIENT
Start: 2025-03-31 | End: 2025-03-31 | Stop reason: HOSPADM

## 2025-03-31 RX ORDER — HYDROMORPHONE HYDROCHLORIDE 2 MG/ML
INJECTION, SOLUTION INTRAMUSCULAR; INTRAVENOUS; SUBCUTANEOUS AS NEEDED
Status: DISCONTINUED | OUTPATIENT
Start: 2025-03-31 | End: 2025-03-31

## 2025-03-31 RX ORDER — DOCUSATE SODIUM 100 MG/1
100 CAPSULE, LIQUID FILLED ORAL 2 TIMES DAILY
Qty: 14 CAPSULE | Refills: 0 | Status: SHIPPED | OUTPATIENT
Start: 2025-03-31 | End: 2025-04-07

## 2025-03-31 RX ORDER — BUPIVACAINE HYDROCHLORIDE 2.5 MG/ML
INJECTION, SOLUTION INFILTRATION; PERINEURAL AS NEEDED
Status: DISCONTINUED | OUTPATIENT
Start: 2025-03-31 | End: 2025-03-31 | Stop reason: HOSPADM

## 2025-03-31 RX ORDER — ASPIRIN 325 MG
325 TABLET, DELAYED RELEASE (ENTERIC COATED) ORAL DAILY
Qty: 14 TABLET | Refills: 0 | Status: SHIPPED | OUTPATIENT
Start: 2025-03-31 | End: 2025-04-14

## 2025-03-31 RX ORDER — LIDOCAINE HYDROCHLORIDE 20 MG/ML
INJECTION, SOLUTION INFILTRATION; PERINEURAL AS NEEDED
Status: DISCONTINUED | OUTPATIENT
Start: 2025-03-31 | End: 2025-03-31

## 2025-03-31 RX ORDER — ONDANSETRON HYDROCHLORIDE 2 MG/ML
4 INJECTION, SOLUTION INTRAVENOUS ONCE AS NEEDED
Status: DISCONTINUED | OUTPATIENT
Start: 2025-03-31 | End: 2025-03-31 | Stop reason: HOSPADM

## 2025-03-31 RX ORDER — MIDAZOLAM HYDROCHLORIDE 1 MG/ML
2 INJECTION, SOLUTION INTRAMUSCULAR; INTRAVENOUS ONCE
Status: COMPLETED | OUTPATIENT
Start: 2025-03-31 | End: 2025-03-31

## 2025-03-31 RX ORDER — CEFAZOLIN SODIUM 2 G/100ML
2 INJECTION, SOLUTION INTRAVENOUS ONCE
Status: COMPLETED | OUTPATIENT
Start: 2025-03-31 | End: 2025-03-31

## 2025-03-31 RX ORDER — FENTANYL CITRATE 50 UG/ML
50 INJECTION, SOLUTION INTRAMUSCULAR; INTRAVENOUS ONCE
Status: COMPLETED | OUTPATIENT
Start: 2025-03-31 | End: 2025-03-31

## 2025-03-31 RX ORDER — MIDAZOLAM HYDROCHLORIDE 1 MG/ML
INJECTION, SOLUTION INTRAMUSCULAR; INTRAVENOUS AS NEEDED
Status: DISCONTINUED | OUTPATIENT
Start: 2025-03-31 | End: 2025-03-31

## 2025-03-31 RX ORDER — FENTANYL CITRATE 50 UG/ML
25 INJECTION, SOLUTION INTRAMUSCULAR; INTRAVENOUS EVERY 5 MIN PRN
Status: DISCONTINUED | OUTPATIENT
Start: 2025-03-31 | End: 2025-03-31 | Stop reason: HOSPADM

## 2025-03-31 RX ORDER — OXYCODONE HYDROCHLORIDE 5 MG/1
5 TABLET ORAL EVERY 4 HOURS PRN
Qty: 30 TABLET | Refills: 0 | Status: SHIPPED | OUTPATIENT
Start: 2025-03-31 | End: 2025-04-07

## 2025-03-31 RX ORDER — ONDANSETRON 4 MG/1
4 TABLET, FILM COATED ORAL EVERY 8 HOURS PRN
Qty: 20 TABLET | Refills: 0 | Status: SHIPPED | OUTPATIENT
Start: 2025-03-31 | End: 2025-04-07

## 2025-03-31 RX ORDER — LABETALOL HYDROCHLORIDE 5 MG/ML
5 INJECTION, SOLUTION INTRAVENOUS ONCE AS NEEDED
Status: DISCONTINUED | OUTPATIENT
Start: 2025-03-31 | End: 2025-03-31 | Stop reason: HOSPADM

## 2025-03-31 RX ORDER — FENTANYL CITRATE 50 UG/ML
INJECTION, SOLUTION INTRAMUSCULAR; INTRAVENOUS AS NEEDED
Status: DISCONTINUED | OUTPATIENT
Start: 2025-03-31 | End: 2025-03-31

## 2025-03-31 RX ORDER — ONDANSETRON HYDROCHLORIDE 2 MG/ML
INJECTION, SOLUTION INTRAVENOUS AS NEEDED
Status: DISCONTINUED | OUTPATIENT
Start: 2025-03-31 | End: 2025-03-31

## 2025-03-31 RX ORDER — MEPERIDINE HYDROCHLORIDE 25 MG/ML
12.5 INJECTION INTRAMUSCULAR; INTRAVENOUS; SUBCUTANEOUS EVERY 10 MIN PRN
Status: DISCONTINUED | OUTPATIENT
Start: 2025-03-31 | End: 2025-03-31 | Stop reason: HOSPADM

## 2025-03-31 RX ORDER — ROCURONIUM BROMIDE 10 MG/ML
INJECTION, SOLUTION INTRAVENOUS AS NEEDED
Status: DISCONTINUED | OUTPATIENT
Start: 2025-03-31 | End: 2025-03-31

## 2025-03-31 RX ORDER — FENTANYL CITRATE 50 UG/ML
50 INJECTION, SOLUTION INTRAMUSCULAR; INTRAVENOUS EVERY 5 MIN PRN
Status: DISCONTINUED | OUTPATIENT
Start: 2025-03-31 | End: 2025-03-31 | Stop reason: HOSPADM

## 2025-03-31 RX ORDER — LIDOCAINE HYDROCHLORIDE 10 MG/ML
0.1 INJECTION, SOLUTION EPIDURAL; INFILTRATION; INTRACAUDAL; PERINEURAL ONCE
Status: DISCONTINUED | OUTPATIENT
Start: 2025-03-31 | End: 2025-03-31 | Stop reason: HOSPADM

## 2025-03-31 RX ORDER — PROPOFOL 10 MG/ML
INJECTION, EMULSION INTRAVENOUS AS NEEDED
Status: DISCONTINUED | OUTPATIENT
Start: 2025-03-31 | End: 2025-03-31

## 2025-03-31 RX ADMIN — MIDAZOLAM 1 MG: 1 INJECTION INTRAMUSCULAR; INTRAVENOUS at 07:28

## 2025-03-31 RX ADMIN — SODIUM CHLORIDE, POTASSIUM CHLORIDE, SODIUM LACTATE AND CALCIUM CHLORIDE: 600; 310; 30; 20 INJECTION, SOLUTION INTRAVENOUS at 07:18

## 2025-03-31 RX ADMIN — SUGAMMADEX 200 MG: 100 INJECTION, SOLUTION INTRAVENOUS at 09:19

## 2025-03-31 RX ADMIN — FENTANYL CITRATE 50 MCG: 0.05 INJECTION, SOLUTION INTRAMUSCULAR; INTRAVENOUS at 07:00

## 2025-03-31 RX ADMIN — MIDAZOLAM 2 MG: 1 INJECTION INTRAMUSCULAR; INTRAVENOUS at 07:05

## 2025-03-31 RX ADMIN — FENTANYL CITRATE 50 MCG: 50 INJECTION INTRAMUSCULAR; INTRAVENOUS at 07:54

## 2025-03-31 RX ADMIN — MIDAZOLAM 1 MG: 1 INJECTION INTRAMUSCULAR; INTRAVENOUS at 07:21

## 2025-03-31 RX ADMIN — PROPOFOL 200 MG: 10 INJECTION, EMULSION INTRAVENOUS at 07:28

## 2025-03-31 RX ADMIN — ROCURONIUM BROMIDE 50 MG: 50 INJECTION INTRAVENOUS at 07:28

## 2025-03-31 RX ADMIN — ONDANSETRON 4 MG: 2 INJECTION, SOLUTION INTRAMUSCULAR; INTRAVENOUS at 09:03

## 2025-03-31 RX ADMIN — FENTANYL CITRATE 50 MCG: 50 INJECTION INTRAMUSCULAR; INTRAVENOUS at 07:28

## 2025-03-31 RX ADMIN — HYDROMORPHONE HYDROCHLORIDE 0.4 MG: 2 INJECTION, SOLUTION INTRAMUSCULAR; INTRAVENOUS; SUBCUTANEOUS at 09:03

## 2025-03-31 RX ADMIN — LIDOCAINE HYDROCHLORIDE 50 MG: 20 INJECTION, SOLUTION INFILTRATION; PERINEURAL at 07:28

## 2025-03-31 RX ADMIN — ROCURONIUM BROMIDE 10 MG: 50 INJECTION INTRAVENOUS at 07:59

## 2025-03-31 RX ADMIN — CEFAZOLIN SODIUM 2 G: 2 INJECTION, SOLUTION INTRAVENOUS at 07:33

## 2025-03-31 RX ADMIN — DEXAMETHASONE SODIUM PHOSPHATE 4 MG: 4 INJECTION INTRA-ARTICULAR; INTRALESIONAL; INTRAMUSCULAR; INTRAVENOUS; SOFT TISSUE at 07:28

## 2025-03-31 ASSESSMENT — PAIN - FUNCTIONAL ASSESSMENT
PAIN_FUNCTIONAL_ASSESSMENT: FLACC (FACE, LEGS, ACTIVITY, CRY, CONSOLABILITY)
PAIN_FUNCTIONAL_ASSESSMENT: 0-10

## 2025-03-31 ASSESSMENT — PAIN SCALES - GENERAL
PAINLEVEL_OUTOF10: 0 - NO PAIN
PAINLEVEL_OUTOF10: 3
PAINLEVEL_OUTOF10: 3
PAINLEVEL_OUTOF10: 0 - NO PAIN

## 2025-03-31 ASSESSMENT — PAIN DESCRIPTION - DESCRIPTORS: DESCRIPTORS: ACHING

## 2025-03-31 NOTE — BRIEF OP NOTE
Date: 3/31/2025  OR Location: [facility]    Name: Dandre Bangura, : 1985, Age: 39 y.o., MRN: [id number], Sex: male    Diagnosis  Pre-op Diagnosis      * Traumatic complete tear of right rotator cuff, initial encounter [S46.011A]     * Subacromial impingement of right shoulder [M75.41]     * Biceps tendinitis of right shoulder [M75.21] Post-op Diagnosis     * Traumatic complete tear of right rotator cuff, initial encounter [S46.011A]     * Subacromial impingement of right shoulder [M75.41]     * Biceps tendinitis of right shoulder [M75.21]     Procedures    83998 subpectoral biceps tenodesis    49450 extensive debridement        Surgeons      * Stevie Boothe - Primary    Resident/Fellow/Other Assistant:  Surgeons and Role:  * No surgeons found with a matching role *    Staff:   Scrub Person: Michael  Circulator: Elli  Scrub Person: Jenny    Anesthesia Staff: Anesthesiologist: Baljit Armstrong MD  CRNA: JIA Ruvalcaba-MARIA DOLORES    Procedure Summary  Anesthesia: Regional, General  ASA: II  Estimated Blood Loss: 10mL  Intra-op Medications:   Administrations occurring from 0700 to 0905 on 25:   Medication Name Total Dose   EPINEPHrine (Adrenalin) 1 mg/mL 1 mg in sodium chloride 0.9 % 3,000 mL irrigation 1 mL   dexAMETHasone (Decadron) injection 4 mg/mL 4 mg   fentaNYL (Sublimaze) injection 50 mcg/mL 100 mcg   HYDROmorphone (Dilaudid) injection 2 mg/mL 0.4 mg   LR bolus Cannot be calculated   lidocaine (Xylocaine) injection 2 % 50 mg   midazolam (Versed) injection 1 mg/mL 2 mg   ondansetron (Zofran) 2 mg/mL injection 4 mg   propofol (Diprivan) injection 10 mg/mL 200 mg   rocuronium (ZeMuron) 50 mg/5 mL injection 60 mg   ceFAZolin (Ancef) 2 g in dextrose (iso)  mL 2 g   midazolam (Versed) injection 2 mg 2 mg              Anesthesia Record               Intraprocedure I/O Totals          Intake    LR bolus 1000.00 mL    Total Intake 1000 mL          Specimen: No specimens collected        Findings: See op report    Complications:  None; patient tolerated the procedure well.     Disposition: PACU - hemodynamically stable.  Condition: stable  Specimens Collected: No specimens collected  Attending Attestation: I was present and scrubbed for the key portions of the procedure.    Stevie Boothe  Phone Number: [phone]

## 2025-03-31 NOTE — DISCHARGE INSTRUCTIONS
Post Op Instructions: Upper Extremity  Seng Boothe M.D., M.P.H.  Office Phone: [phone]  After Hours Line: [phone]      First PT Appointment:  Within 1-2 weeks    First Post Op Appointment:  4/15/25   Please make an appointment with a physical therapist of your choice ASAP, preferably starting the day after surgery. We recommend PT 2-3x/week for 6-12 weeks after surgery.     If you have any questions, please read this information in its entirety, and then call with any questions.        MEDICATIONS PRESCRIBED FOR AFTER SURGERY:  Your preferred pharmacy on file is where your medications have been ePrescribed: Please  ASAP  FOR PAIN RELIEF (Narcotics):    You have been provided pain medication after your surgery, please take as directed. Wean off of narcotics as soon as symptoms allow. Take with food. Notify office if nausea, vomiting, headaches, or rash occurs. Other side effects include; drowsiness and constipation.    Use caution when taking TYLENOL or other acetaminophen products while taking Percocet, Norco, or Lorcet as these medications already contain acetaminophen. Do NOT exceed more than 3000mg of Tylenol per day.    We do NOT recommend ibuprofen, Advil, Aleve, Motrin, or NSAID products for 6wks after surgery, as these can delay healing.    You should not drive while taking pain medications as they delay your responses.    Narcotics are addictive and have a high abuse and overdose potential. It is extremely important to take these as directed and not to mix with alcohol or other forms of medication unless approved by the medical team. Please keep all prescribed narcotics LOCKED and away from children. Lastly, please properly dispose of leftover narcotics. Contact your local police department for more info.   o OXYCODONE 5 mg: this is a short-acting medication for pain. You should take 1 or 2 tablets every 4 to 6 hours AS NEEDED. If you are not in pain, you should not take this  medication. Try to wean down your use of this drug as soon as symptoms allow. If 2 tablets every 4 hours are not relieving all your pain please call our office or the MD on Call.     FOR NAUSEA:   o ZOFRAN (Odansteron) 4mg Tablet - this medication is for nausea or vomiting. Place 1 tablet under the tongue every 8 hours as needed for nausea. If you are not nauseous, you do not need to take this medication. Please call our office if you still experience nausea despite taking this medication.    FOR ANTI-COAGULATION (Blood Thinners):   You have been prescribed an anticoagulation medication, to be taken after surgery. This medication is taken to prevent a blood clot from developing in your leg, which is a possible complication after any surgery. This medication is required after all surgeries. You must finish the entire prescription of anticoagulation medication, no matter what type of procedure you had.   o ASPIRIN 325 mg: This is a mild blood thinner. Please take 1 tablet every day as instructed weeks: starting the day after surgery, no matter what kind of procedure you had. Finish the entire prescription bottle, even if you are feeling better.     OTHER MEDICATIONS TO CONSIDER:   o TYLENOL: You can buy this Over The Counter. Some pain medications contain Tylenol, including Norco, Lorcet, and Percocet. Oxycodone and Dilaudid DO NOT contain Tylenol, and it is recommended to add in Tylenol for additional pain control if needed. This can be taken as 325-600mg every 4 hours. The maximum dose for Tylenol per day is 3000mg.   o MIRALAX, COLACE, SENNA OR DOCUSATE: These are over-the-counter mild laxatives and stool softeners for prevention of constipation. We suggest beginning these the day after surgery if you are taking narcotics. Please call the office if you have not had a bowel movement for three days after your surgery.   o BENADRYL: Itching can be common when taking narcotics. Take this as needed for any itching  symptoms. Can be found overthe-counter.    DRESSING/BANDAGE CHANGES:   You may change your surgical dressing three days after surgery, or if you are seeing a physical therapist, you may have them do this for you. Please read these directions in their entirety before beginning a dressing change.    Take down/remove all the bandages and replace with water-proof bandages which you can shower over.   You may notice suture/stitches on your incision, these may be black or they may be clear, like fishing line. These will be removed at your first post-op appointment, or if you are following up elsewhere, they may be removed by your physical therapist or another physician 10-14 days after surgery.      OTHER GENERAL SURGERY INFORMATION  ICE: Swelling and bruising is normal after surgery because fluids are used during surgery. Continuous icing will help to decrease swelling and pain. It is best to ice at least 5-6 times a day for 20-30 minutes. It is very important to always have a protective  between the ice and your skin. You may use ice bags, frozen wraps, frozen peas or a NICE or Game Ready unit (an ice/compression machine). If you have received a NICE machine and have any questions regarding its use, please call the number provided with the machine.    DRIVING: Please do not drive until you are evaluated in the office after surgery. You are considered an impaired  following surgery, and if you choose to drive, your insurance may not cover any accidents that occur.     PHYSICAL THERAPY: This is dependent on your injury and specific procedure. You will be given specific protocol after your surgery.    ACTIVITY RESTRICTIONS/BRACE/SLING: This is dependent on your injury and specific procedure. You may be required to use a brace or sling. The type of surgery you had will dictate how long to wear your brace/sling. Your PT protocol will outline any restrictions you may have with lifting and range of motion. If  you are placed in a sling/brace, it is extremely important to use as directed and make sure you always have the sling on when ambulating (walking). It is important that you follow all instructions regarding activity restrictions as they are intended to promote healing and prevent complications. You may take the brace/sling off if you need to change the bandages, change clothes, or shower (be extra cautious!), or if you are sitting. We recommend wearing the brace even while you are sleeping unless told otherwise y our team.     SIGNS AND SYMPTOMS OF COMPLICATIONS: Although complications are rare the following are a list of concerns you should be aware of:    Infection - increased pain not relieved with medication, fever, chills, redness, swelling or drainage from incision.    Blood Clot - swelling, tenderness, or calf pain to touch or when you move your ankle up and down, shortness of breath and chest pain.    REASONS TO CALL:   ? Fever, chills or sweats   ? Redness, swelling or warmth around the incisions, non-clear drainage from the incision or increased pain in or around the incision   ? Calf swelling, redness, pain or warmth   ? Chest pain, difficulty breathing, or cough   ? Inability to have a bowel movement after 3 days

## 2025-03-31 NOTE — ANESTHESIA PROCEDURE NOTES
Peripheral Block    Patient location during procedure: pre-op  Start time: 3/31/2025 7:03 AM  End time: 3/31/2025 7:05 AM  Reason for block: at surgeon's request and post-op pain management  Staffing  Performed: attending   Authorized by: Baljit Armstrong MD    Performed by: Abner Light DO  Preanesthetic Checklist  Completed: patient identified, IV checked, site marked, risks and benefits discussed, surgical consent, monitors and equipment checked, pre-op evaluation and timeout performed   Timeout performed at: 3/31/2025 7:00 AM  Peripheral Block  Patient position: sitting  Prep: ChloraPrep  Patient monitoring: heart rate, cardiac monitor and continuous pulse ox  Block type: interscalene  Laterality: right  Injection technique: single-shot  Guidance: ultrasound guided  Local infiltration: ropivacaine  Infiltration strength: 0.5 %  Dose: 20 mL  Needle  Needle gauge: 22 G  Needle length: 5 cm  Needle localization: ultrasound guidance  Assessment  Injection assessment: negative aspiration for heme, no paresthesia on injection, incremental injection and local visualized surrounding nerve on ultrasound  Paresthesia pain: none  Heart rate change: no  Slow fractionated injection: yes  Additional Notes  With 4mg Decadron

## 2025-03-31 NOTE — ANESTHESIA PROCEDURE NOTES
Airway  Date/Time: 3/31/2025 7:31 AM  Urgency: elective    Airway not difficult    Staffing  Performed: CRNA   Authorized by: Baljit Armstrong MD    Performed by: JIA Ruvalcaba-MARIA DOLORES  Patient location during procedure: OR    Indications and Patient Condition  Indications for airway management: anesthesia  Sedation level: deep  Preoxygenated: yes  Patient position: sniffing  Mask difficulty assessment: 1 - vent by mask    Final Airway Details  Final airway type: endotracheal airway      Successful airway: ETT     Successful intubation technique: direct laryngoscopy  Endotracheal tube insertion site: oral  Blade: Loan  Blade size: #4  ETT size (mm): 7.0  Cormack-Lehane Classification: grade I - full view of glottis  Placement verified by: capnometry   Measured from: lips  ETT to lips (cm): 22  Number of attempts at approach: 1

## 2025-03-31 NOTE — ANESTHESIA POSTPROCEDURE EVALUATION
Patient: Dandre Bangura    Procedure Summary       Date: 03/31/25 Room / Location: [facility] / [facility]    Anesthesia Start: 0718 Anesthesia Stop: 0933    Procedures:       Rotator Cuff Repair (Right: Shoulder)      Subacromial Decompression (Right: Shoulder)      Subpectoral Biceps Tenodesis (Right: Shoulder) Diagnosis:       Traumatic complete tear of right rotator cuff, initial encounter      Subacromial impingement of right shoulder      Biceps tendinitis of right shoulder      (Traumatic complete tear of right rotator cuff, initial encounter [S46.011A])      (Subacromial impingement of right shoulder [M75.41])      (Biceps tendinitis of right shoulder [M75.21])    Surgeons: Stevie Boothe MD MPH Responsible Provider: Baljit Armstrong MD    Anesthesia Type: general, regional ASA Status: 2            Anesthesia Type: general, regional    Vitals Value Taken Time   /85 03/31/25 0949   Temp 36.2 °C (97.2 °F) 03/31/25 0949   Pulse 65 03/31/25 0949   Resp 14 03/31/25 0949   SpO2 97 % 03/31/25 0949       Anesthesia Post Evaluation    Patient location during evaluation: PACU  Patient participation: complete - patient participated  Level of consciousness: awake  Pain management: adequate  Airway patency: patent  Cardiovascular status: acceptable  Respiratory status: acceptable  Hydration status: acceptable  Postoperative Nausea and Vomiting: none        There were no known notable events for this encounter.
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
## 2025-03-31 NOTE — OP NOTE
Rotator Cuff Repair (R), Subacromial Decompression (R), Subpectoral Biceps Tenodesis (R) Operative Note     Date: 3/31/2025  OR Location: [facility]    Name: Dandre Bangura, : 1985, Age: 39 y.o., MRN: [id number], Sex: male    Diagnosis  Pre-op Diagnosis      * Traumatic complete tear of right rotator cuff, initial encounter [S46.011A]     * Subacromial impingement of right shoulder [M75.41]     * Biceps tendinitis of right shoulder [M75.21] Post-op Diagnosis     * Traumatic complete tear of right rotator cuff, initial encounter [S46.011A]     * Subacromial impingement of right shoulder [M75.41]     * Biceps tendinitis of right shoulder [M75.21]     Procedures    35562 biceps tenodesis    20434 removal of foreign body (previous implant)    92106 extensive debridement    Surgeons      * Stevie Boothe - Primary    Resident/Fellow/Other Assistant:  Surgeons and Role:  * No surgeons found with a matching role *    Staff:   Scrub Person: Michael  Circulator: Elli  Scrub Person: Jenny    Anesthesia Staff: Anesthesiologist: Baljit Armstrong MD  CRNA: JIA Ruvalcaba-MARIA DOLORES    Procedure Summary  Anesthesia: Regional, General  ASA: II  Estimated Blood Loss: minimal mL  Intra-op Medications:   Administrations occurring from 0700 to 0905 on 25:   Medication Name Total Dose   EPINEPHrine (Adrenalin) 1 mg/mL 1 mg in sodium chloride 0.9 % 3,000 mL irrigation 1 mL   ceFAZolin (Ancef) 2 g in dextrose (iso)  mL 2 g   midazolam (Versed) injection 2 mg 2 mg   dexAMETHasone (Decadron) injection 4 mg/mL 4 mg   fentaNYL (Sublimaze) injection 50 mcg/mL 100 mcg   HYDROmorphone (Dilaudid) injection 2 mg/mL 0.4 mg   LR bolus Cannot be calculated   lidocaine (Xylocaine) injection 2 % 50 mg   midazolam (Versed) injection 1 mg/mL 2 mg   ondansetron (Zofran) 2 mg/mL injection 4 mg   propofol (Diprivan) injection 10 mg/mL 200 mg   rocuronium (ZeMuron) 50 mg/5 mL injection 60 mg              Anesthesia Record                Intraprocedure I/O Totals          Intake    LR bolus 1000.00 mL    Total Intake 1000 mL          Specimen: No specimens collected              Drains and/or Catheters: * None in log *    Tourniquet Times:         Implants:  Implants       Type Name Action Serial No.      Implant IMPLANT SYSTEM, FIBERTAK, W/ BICEPS ANCHOR - [id number] Implanted               Indications: Dandre Bangura is an 39 y.o. male who is having surgery for Traumatic complete tear of right rotator cuff, initial encounter [S46.011A]  Subacromial impingement of right shoulder [M75.41]  Biceps tendinitis of right shoulder [M75.21]. Presented to the outpatient clinic for evaluation ofright  shoulder full-thickness rotator cuff tear involving supraspinatus tendon and biceps tendonitis.  We lengthy discussion in clinic today regarding the risks, benefits, and alternatives of conservative versus operative management.  He is a highly active individual and wants to get back to high-level of weight training and would like to proceed with operative management in the form of arthroscopic rotator cuff repair, biceps tenodesis, and subacromial decompression.  We discussed the we could give him an injection in clinic today to help with some pain relief, but he would like his rotator cuff fixed as definitive management which we discussed was reasonable given the acute nature of his injury back in October.  Risk of the procedure include but are not limited to infection, nerve damage, blood loss, persistent pain, failure of repair, need for further surgery, blood clot, and death.  He is understanding these risks would like to move forward with surgery.      The patient was seen in the preoperative area. The risks, benefits, complications, treatment options, non-operative alternatives, expected recovery and outcomes were discussed with the patient. The possibilities of reaction to medication, pulmonary aspiration, injury to surrounding structures,  bleeding, recurrent infection, the need for additional procedures, failure to diagnose a condition, and creating a complication requiring transfusion or operation were discussed with the patient. The patient concurred with the proposed plan, giving informed consent.  The site of surgery was properly noted/marked if necessary per policy. The patient has been actively warmed in preoperative area. Preoperative antibiotics have been ordered and given within 1 hours of incision. Venous thrombosis prophylaxis have been ordered including bilateral sequential compression devices    Procedure Details:   DESCRIPTION OF PROCEDURE: The patient presented to the operative facility for the above-noted procedures. The patient was met in the preoperative holding area, where the operative site was marked, consents were reviewed and signed, and all preoperative questions were answered. A peripheral nerve block was performed by the Anesthesia Team. The patient was taken to the operating room and placed under general anesthesia. Care was taken to ensure all bony prominences were well padded. The patient was then positioned in a beach chair position. The right shoulder was prepped and draped in usual sterile fashion. A preoperative time-out was performed to confirm correct patient, correct operative site, to ensure all necessary implants and equipment were in the room, as well as to address any anesthesia or nursing concerns. Preoperative antibiotics were administered within 30 minutes of incision time.     EXAMINATION UNDER ANESTHESIA: The patient displayed full passive range of motion. Forward flexion to 170, abduction to 170, external rotation to 60. The glenohumeral joint was stable to both anterior and posterior translation.    OPERATIVE FINDINGS: There was extensive synovitis in the subcoracoid recess and the recess superior to the glenoid labrum. There were diffuse degenerative changes of the anterior and posterior labrum.  In  addition, there is evidence of previous SLAP repair with exposed previous suture implants.  There were no grade 1/2 cartilaginous changes of the humeral head or glenoid which debrided down to a stable rim utilizing a shaver. There were notable adhesions in the rotator interval. The biceps tendon was noted to be significantly tenosynovitic and frayed, with previous orthopedic implant extravasated from the bone and sitting just inferior to the biceps anchor.  The superior aspect of the biceps was also scarred down to the superior rotator cuff.  The subscapularis tendon was intact, inserting into the lesser tuberosity. The supraspinatus was intact, inserting into from the footprint on the greater tuberosity. The posterior cuff was intact, inserting into the greater tuberosity.    SHOULDER DIAGNOSTIC ARTHROSCOPY, REMOVAL OF FOREIGN BODY, EXTENSIVE DEBRIDEMENT, LYSIS OF ADHESIONS:   A standard posterolateral arthroscopic portal was made 2 cm medial and inferior to the posterolateral border of the acromion. The arthroscope was introduced into the glenohumeral joint. Under direct visualization, an anterior portal was made in the rotator interval. A diagnostic arthroscopy was performed with the findings as noted below:    There was extensive synovitis in the subcoracoid recess and recess superior to the glenoid labrum. This was debrided using a 4.0 mm shaver and an electrocautery device. There was degenerative tearing of the anterosuperior and posterior labrum, which was also debrided with a 4.0 mm shaver.  The exposed anchor along the anterior aspect of the bicipital anchor was removed utilizing arthroscopic grasper.  There were grade 1/2 changes of the humeral head and glenoid, a gentle chondroplasty was performed with the 4.0 mm shaver. There were notable adhesions in the rotator interval, which were lysed with a combination of a radiofrequency device as well as the shaver. The biceps tendon was noted to be significantly  tenosynovitic and frayed, and it was released from its insertion on the superior labrum using the electrocautery device for later open biceps tenodesis. The subcapsularis tendon was intact, inserting into the lesser tuberosity. The supraspinatus and posterior cuff were intact, inserting into the greater tuberosity.      SUBACROMIAL DECOMPRESSION:   Next, the arthroscope was introduced into the subacromial space. An extensive bursectomy and synovectomy was performed in the subacromial space using a combination of 4.5 mm shaver and the ArthroCare device.     OPEN BICEPS TENODESIS:   Attention was then turned to the open biceps tenodesis. A 3 cm incision was made in the axilla. Bovie electrocautery was used to achieve hemostasis. The interval between the pectoralis major and conjoint tendon was developed using sharp dissection. Next, the bicipital groove was identified. The bicipital sheath was opened with Metzenbaum scissors. The biceps tendon was removed from its groove and a biceps FiberTak anchor was drilled and placed and then the tendon was whipstitched with the 2 suture tapes from the anchor. The knots were then tensioned and tied down, tenodesing the biceps into the proximal humerus.    WOUND CLOSURE: All wounds were thoroughly irrigated. The biceps tenodesis deep dermal layer was closed with 2-0 buried Vicryl suture and skin with subcuticular 3-0 Monocryl suture. The arthroscopy portals were closed with buried 3-0 Monocryl suture. All incisions were then closed with surgical glue and sterile dressings were applied.     Complications:  None; patient tolerated the procedure well.    Disposition: PACU - hemodynamically stable.  Condition: stable     Additional Details: None    Attending Attestation: I was present and scrubbed for the key portions of the procedure.    Stevie Boothe  Phone Number: [phone]

## 2025-03-31 NOTE — ANESTHESIA PREPROCEDURE EVALUATION
Patient: Dandre Bangura    Procedure Information       Date/Time: 03/31/25 0700    Procedures:       Rotator Cuff Repair (Right: Shoulder)      Subacromial Decompression (Right: Shoulder)      Subpectoral Biceps Tenodesis (Right: Shoulder)    Location: [facility] / [facility]    Surgeons: Stevie Boothe MD MPH            Relevant Problems   Cardiac   (+) Essential hypertension       Clinical information reviewed:   Tobacco  Allergies  Meds   Med Hx  Surg Hx   Fam Hx  Soc Hx        NPO Detail:  NPO/Void Status  Date of Last Liquid: 03/31/25  Time of Last Liquid: 0500  Date of Last Solid: 03/30/25  Time of Last Solid: 1900  Last Intake Type: Clear fluids  Time of Last Void: 0535         Physical Exam    Airway  Mallampati: II  TM distance: >3 FB  Neck ROM: full     Cardiovascular    Dental    Pulmonary    Abdominal            Anesthesia Plan    History of general anesthesia?: yes  History of complications of general anesthesia?: no    ASA 2     general and regional     intravenous induction   Anesthetic plan and risks discussed with patient.    Plan discussed with CRNA.

## 2025-04-01 ASSESSMENT — PAIN SCALES - GENERAL: PAINLEVEL_OUTOF10: 2

## 2025-04-08 ENCOUNTER — EVALUATION (OUTPATIENT)
Dept: PHYSICAL THERAPY | Facility: CLINIC | Age: 40
End: 2025-04-08
Payer: COMMERCIAL

## 2025-04-08 DIAGNOSIS — M24.111 LABRAL TEAR OF SHOULDER, DEGENERATIVE, RIGHT: ICD-10-CM

## 2025-04-08 DIAGNOSIS — M25.811 SHOULDER IMPINGEMENT, RIGHT: ICD-10-CM

## 2025-04-08 DIAGNOSIS — M75.81 RIGHT ROTATOR CUFF TENDONITIS: ICD-10-CM

## 2025-04-08 DIAGNOSIS — M75.21 BICEPS TENDONITIS ON RIGHT: ICD-10-CM

## 2025-04-08 PROCEDURE — 97110 THERAPEUTIC EXERCISES: CPT | Mod: GP | Performed by: PHYSICAL THERAPIST

## 2025-04-08 PROCEDURE — 97140 MANUAL THERAPY 1/> REGIONS: CPT | Mod: GP | Performed by: PHYSICAL THERAPIST

## 2025-04-08 PROCEDURE — 97162 PT EVAL MOD COMPLEX 30 MIN: CPT | Mod: GP | Performed by: PHYSICAL THERAPIST

## 2025-04-08 NOTE — PROGRESS NOTES
Physical Therapy Evaluation    Patient Name: Dandre Bangura  MRN: [id number]  Evaluation Date: 4/8/2025  Time Calculation  Start Time: 0800  Stop Time: 0900  Time Calculation (min): 60 min  PT Evaluation Time Entry  PT Evaluation (Moderate) Time Entry: 20     PT Therapeutic Procedures Time Entry  Manual Therapy Time Entry: 15  Therapeutic Exercise Time Entry: 15       Problem List Items Addressed This Visit    None  Visit Diagnoses         Codes    Shoulder impingement, right     M25.811    Relevant Orders    Follow Up In Physical Therapy    Labral tear of shoulder, degenerative, right     M24.111    Biceps tendonitis on right     M75.21    Relevant Orders    Follow Up In Physical Therapy    Right rotator cuff tendonitis     M75.81    Relevant Orders    Follow Up In Physical Therapy              Subjective   General:       Patient reported hx of condition:   L shoulder surgery 2002  R  6/2003 slap repair   Was chopping wood in October and had pain ever since.  Underwent a biceps tenodesis 3/31/2025      Surgery:   Biceps tenodesis 3/31/2025    Precautions:   HTN    Relevant PMH:  HTN    Red flags: none    Pain:  1/2-4  Home Living:         Lives with: Spouse  Occupation: full time job doing   Work tasks: [employer]  Hobbies/activities: kayaking, running, lifting, jet skiing, mountain biking             OBJECTIVE:  Objective   Posture:   Slightly forward posture  Range of Motion:     AROM L R   Shoulder flexion WFL 90 deg   Elbow flex/ext /-20   Shoulder Abduction WFL 80 deg   Shoulder External Rotation WFL 20 at 45 deg   Shoulder Internal Rotation WFL 60 at 45 deg      Strength:   NT due to surgical precautions               Outcome Measures:  Spadi: 42/130    Assessment       Pt is a 39 y.o. male who presents with impairments of decreased strength, ROM etc. These impairments have led to functional limitations. Pt would benefit from skilled physical therapy intervention to improve above impairments and  facilitate return to function.    Complexity of Evaluation: Moderate    Based on the history including personal factors and/or comorbidities, examination of body systems including body structures and function, activity limitations, and/or participation restrictions, as well as clinical presentation, patient meets criteria for above complexity evaluation.    Plan  Treatment/Interventions: Biofeedback, Blood flow restriction therapy, Cryotherapy, Dry needling, Education/ Instruction, Electrical stimulation, Hot pack, Manual therapy, Neuromuscular re-education, Self care/ home management, Taping techniques, Therapeutic activities, Therapeutic exercises, Ultrasound  PT Plan: Skilled PT  PT Frequency: 1 time per week  Certification Period Start Date: 04/08/25  Certification Period End Date: 07/07/25  Number of Treatments Authorized: 12 POC  Rehab Potential: Good  Plan of Care Agreement: Patient    Insurance Plan: Payor: [employer] / Plan: [employer] / Product Type: *No Product type* /     Plan for next visit: follow protocol        Today's Treatment:  STM to the R biceps avoiding incisions followed by PROM to the elbow and shoulder all planes    BEVERLY:  Wall slides  Scapular retractions  AROM elbow flexion/extension  Pendulums    Goals:    Patient will be independent with progression of home exercise program   Patient will demonstrate improved affected GHJ AROM to 160 degrees flexion, 160 degrees abduction, 55 degrees internal rotation, 70 degrees external rotation or greater to decrease difficulty with dressing, personal hygiene, and household chores  Patient to have full elbow flexion and extension to allow for dressing and personal hygiene without difficulty  4.  Patient will demonstrate increased affected UE strength to 4+/5 all planes to improve dynamic stability of elbow and GHJ to engage in heavy household chores and seasonal yardwork safely   5.  SPADI less than or equal to 10% to  demonstrate improvement in functional activities

## 2025-04-15 ENCOUNTER — TREATMENT (OUTPATIENT)
Dept: PHYSICAL THERAPY | Facility: CLINIC | Age: 40
End: 2025-04-15
Payer: COMMERCIAL

## 2025-04-15 ENCOUNTER — APPOINTMENT (OUTPATIENT)
Dept: ORTHOPEDIC SURGERY | Facility: CLINIC | Age: 40
End: 2025-04-15
Payer: COMMERCIAL

## 2025-04-15 DIAGNOSIS — M25.811 SHOULDER IMPINGEMENT, RIGHT: ICD-10-CM

## 2025-04-15 DIAGNOSIS — Z98.890 STATUS POST SURGERY: Primary | ICD-10-CM

## 2025-04-15 DIAGNOSIS — M75.81 RIGHT ROTATOR CUFF TENDONITIS: ICD-10-CM

## 2025-04-15 DIAGNOSIS — M75.21 BICEPS TENDONITIS ON RIGHT: ICD-10-CM

## 2025-04-15 PROCEDURE — 99024 POSTOP FOLLOW-UP VISIT: CPT | Performed by: STUDENT IN AN ORGANIZED HEALTH CARE EDUCATION/TRAINING PROGRAM

## 2025-04-15 PROCEDURE — 97140 MANUAL THERAPY 1/> REGIONS: CPT | Mod: GP | Performed by: PHYSICAL THERAPIST

## 2025-04-15 PROCEDURE — 97110 THERAPEUTIC EXERCISES: CPT | Mod: GP | Performed by: PHYSICAL THERAPIST

## 2025-04-15 ASSESSMENT — PAIN - FUNCTIONAL ASSESSMENT: PAIN_FUNCTIONAL_ASSESSMENT: NO/DENIES PAIN

## 2025-04-15 NOTE — PROGRESS NOTES
PRIMARY CARE PHYSICIAN: JIA Perera-CNP    ORTHOPAEDIC POSTOPERATIVE VISIT    ASSESSMENT & PLAN    Impression: 40 y.o. male 2 weeks 1 day postop s/p right shoulder subacromial decompression with biceps tenodesis, overall doing well.    Plan:   At this time, he will wean out of his sling.  He will continue with resisted elbow flexion through 6 weeks postoperatively.  He will continue with range of motion utilizing her physical therapy resources.  All questions were answered.    I reviewed the intraoperative findings with the patient and answered all their questions. I reviewed their postoperative timeline and plan with them. They understand the postoperative precautions and the treatment plan going forward.     Follow-Up: Patient will follow-up in 4 to 6 weeks.    At the end of the visit, all questions were answered in full. The patient is in agreement with the plan and recommendations. They will call the office with any questions/concerns.      Note dictated with [employer] software. Completed without full typed error editing and sent to avoid delay.      SUBJECTIVE  CHIEF COMPLAINT:   Chief Complaint   Patient presents with    Right Shoulder - Follow-up, Post-op     Sx 3-31-25 Post op. No Pain. 2 week post-op        HPI: Dandre Bangura is a 40 y.o. patient. Dandre Bangura is now postop status post right shoulder arthroscopic subacromial decompression with biceps tenodesis.  The patient has recovered incredibly well.  He has absolutely no pain and is not on any narcotic medication.  He has been continuing to use the sling as instructed.  He has begun working with physical therapy focusing on range of motion.  No reports of distal numbness or tingling.  No reported issues with surgical incisions.    REVIEW OF SYSTEMS  Constitutional: See HPI for pain assessment, No significant weight loss, recent trauma  Cardiovascular: No chest pain, shortness of breath  Respiratory: No difficulty  breathing, cough  Gastrointestinal: No nausea, vomiting, diarrhea, constipation  Musculoskeletal: Noted in HPI, positive for pain, restricted motion, stiffness  Integumentary: No rashes, easy bruising, redness   Neurological: no numbness or tingling in extremities, no gait disturbances   Psychiatric: No mood changes, memory changes, social issues  Heme/Lymph: no excessive swelling, easy bruising, excessive bleeding  ENT: No hearing changes  Eyes: No vision changes    CURRENT MEDICATIONS:   Current Outpatient Medications   Medication Sig Dispense Refill    chlorhexidine (Peridex) 0.12 % solution Use cap to measure 15 mL.  Swish/gargle mouthwash for at least 30 seconds.  Do not swallow.  Use night before surgery after brushing teeth and morning of surgery after brushing teeth. 473 mL 0    EPINEPHrine (Epipen) 0.3 mg/0.3 mL injection syringe Inject 0.3 mL (0.3 mg) into the shoulder, thigh, or buttocks 1 time if needed for anaphylaxis for up to 1 dose. Call 911 after use. 1 each 0    fluticasone (Flonase Allergy Relief) 50 mcg/actuation nasal spray Administer into affected nostril(s).      losartan (Cozaar) 25 mg tablet Take 1 tablet (25 mg) by mouth once daily. 90 tablet 3    meloxicam (Mobic) 15 mg tablet TAKE 1 TABLET BY MOUTH EVERY DAY 30 tablet 1     No current facility-administered medications for this visit.        OBJECTIVE    PHYSICAL EXAM      3/31/2025     5:42 AM 3/31/2025     5:50 AM 3/31/2025     7:00 AM 3/31/2025     9:26 AM 3/31/2025     9:40 AM 3/31/2025     9:49 AM 3/31/2025    10:00 AM   Vitals   Systolic  147 150 134 136 134 140   Diastolic  99 97 81 87 85 88   Heart Rate  64 75 57 70 65 67   Temp  36.6 °C (97.9 °F)  36 °C (96.8 °F)  36.2 °C (97.2 °F) 36.2 °C (97.2 °F)   Resp  16 13 14 14 14 16   Weight (lb) 191.58         BMI 28.29 kg/m2         BSA (m2) 2.06 m2            There is no height or weight on file to calculate BMI.    General: Well-appearing, no acute distress    Skin intact bilateral  upper and lower extremities  No erythema  No warmth    Detailed examination of the right shoulder demonstrates:  Surgical incision(s) healing well.  No erythema or warmth  No drainage  No ecchymosis  Resolving swelling, minimal  Biceps contour normal  Shoulder range of motion deferred  Upper extremity motor grossly intact  C5-T1 sensation intact bilaterally  2+ radial pulses bilaterally  Warm and well-perfused, brisk capillary refill    IMAGING:   No new imaging    Seng Boothe MD, MPH  Attending Surgeon    Sports Medicine Orthopaedic Surgery  [facility] [place]  [facility] [employer]

## 2025-04-15 NOTE — PROGRESS NOTES
"    Physical Therapy Treatment    Patient Name: Dandre Bangura  MRN: [id number]  Encounter date:  4/15/2025  Time Calculation  Start Time: 1300  Stop Time: 1344  Time Calculation (min): 44 min     PT Therapeutic Procedures Time Entry  Manual Therapy Time Entry: 10  Therapeutic Exercise Time Entry: 32       Visit Number:  2 (including evaluation)  Planned total visits: 12  Visits Authorized/Insurance Coverage:  25 INS    Current Problem  Problem List Items Addressed This Visit    None  Visit Diagnoses         Codes    Shoulder impingement, right     M25.811    Biceps tendonitis on right     M75.21    Right rotator cuff tendonitis     M75.81            Surgery  R biceps tenodesis    Surgery date:  3/31/2025    Precautions   No resisted elbow flexion until at least May 12th     Pain   1/10    Subjective  General        The patient saw the doctor today and was told to wean from the sling and to continue with no resisted elbow flexion for 6 weeks    Objective  120 degrees PROM shoulder flexion    Treatment:     Pulleys flexion 2'  3 way biceps AROM 2x10 ea  Scapular retraction 2x10  Wall slides flexion and abduction x10 3\" hold  Stand shoulder flexion, scaption and abduction x10 ea  Prone shoulder flexion    Manual:  PROM to the R shoulder and elbow all planes.  Gently grade 2 posterior shoulder mobs.  STM to the R shoulder and biceps.             Has patient been compliant with HEP? Yes           Assessment:     Pt's response to treatment:  The patient tolerated today's initial PT treatment well with no co increased s/s.  HEP updated    Pain end of session: unchanged    Plan:     Continue with current POC/no changes    Assessment of current progress against goals:  Insufficient treatment time to assess progress    Goals:   Patient will be independent with progression of home exercise program   Patient will demonstrate improved affected GHJ AROM to 160 degrees flexion, 160 degrees abduction, 55 degrees internal rotation, " 70 degrees external rotation or greater to decrease difficulty with dressing, personal hygiene, and household chores  Patient to have full elbow flexion and extension to allow for dressing and personal hygiene without difficulty  4.  Patient will demonstrate increased affected UE strength to 4+/5 all planes to improve dynamic stability of elbow and GHJ to engage in heavy household chores and seasonal yardwork safely   5.  SPADI less than or equal to 10% to demonstrate improvement in functional activities

## 2025-04-22 ENCOUNTER — TREATMENT (OUTPATIENT)
Dept: PHYSICAL THERAPY | Facility: CLINIC | Age: 40
End: 2025-04-22
Payer: COMMERCIAL

## 2025-04-22 DIAGNOSIS — M25.811 SHOULDER IMPINGEMENT, RIGHT: ICD-10-CM

## 2025-04-22 DIAGNOSIS — M75.21 BICEPS TENDONITIS ON RIGHT: ICD-10-CM

## 2025-04-22 DIAGNOSIS — M75.81 RIGHT ROTATOR CUFF TENDONITIS: ICD-10-CM

## 2025-04-22 PROCEDURE — 97140 MANUAL THERAPY 1/> REGIONS: CPT | Mod: GP | Performed by: PHYSICAL THERAPIST

## 2025-04-22 PROCEDURE — 97110 THERAPEUTIC EXERCISES: CPT | Mod: GP | Performed by: PHYSICAL THERAPIST

## 2025-04-22 NOTE — PROGRESS NOTES
"    Physical Therapy Treatment    Patient Name: Dandre Bangura  MRN: [id number]  Encounter date:  4/22/2025  Time Calculation  Start Time: 0900  Stop Time: 0953  Time Calculation (min): 53 min     PT Therapeutic Procedures Time Entry  Manual Therapy Time Entry: 43  Therapeutic Exercise Time Entry: 10       Visit Number:  3 (including evaluation)  Planned total visits: 12  Visits Authorized/Insurance Coverage:  25 INS    Current Problem  Problem List Items Addressed This Visit    None  Visit Diagnoses         Codes      Shoulder impingement, right     M25.811      Biceps tendonitis on right     M75.21      Right rotator cuff tendonitis     M75.81            Surgery  R biceps tenodesis    Surgery date:  3/31/2025    Precautions   No resisted elbow flexion until at least May 12th     Pain   1/10    Subjective  General        The patient reports good tolerance to the last session and compliance with HEP    Objective  Incisional adhesions palpated  Pain with internal rotation stretch     Treatment:     Pulleys flexion and scaption 2' ea  3 way biceps AROM x20 ea  Scapular retraction 3\" 2x10  Wall slides flexion and abduction x10 3\" hold  Stand shoulder flexion, scaption and abduction x10 ea  Prone shoulder extension x15  Prone horizontal abduction x15  Prone row x15  Supine serratus punch x15  Side lying ER x15  Posterior capsule stretch 3x 30\"  Internal rotation towel stretch- pain therefore stopped    Manual:  PROM to the R shoulder and elbow all planes.  Gently grade 2 posterior shoulder mobs.  STM to the R shoulder and biceps.             Has patient been compliant with HEP? Yes           Assessment:     Pt's response to treatment:  The patient tolerated today's session well.  Pain with internal rotation stretch thus did not perform.  HEP updated.    Pain end of session: unchanged    Plan:     Continue with current POC/no changes.  FOLLOW PROTOCOL    Assessment of current progress against goals:  Progressing towards " goals, dysfunction remains    Goals:   Patient will be independent with progression of home exercise program   Patient will demonstrate improved affected GHJ AROM to 160 degrees flexion, 160 degrees abduction, 55 degrees internal rotation, 70 degrees external rotation or greater to decrease difficulty with dressing, personal hygiene, and household chores  Patient to have full elbow flexion and extension to allow for dressing and personal hygiene without difficulty  4.  Patient will demonstrate increased affected UE strength to 4+/5 all planes to improve dynamic stability of elbow and GHJ to engage in heavy household chores and seasonal yardwork safely   5.  SPADI less than or equal to 10% to demonstrate improvement in functional activities

## 2025-04-29 ENCOUNTER — TREATMENT (OUTPATIENT)
Dept: PHYSICAL THERAPY | Facility: CLINIC | Age: 40
End: 2025-04-29
Payer: COMMERCIAL

## 2025-04-29 DIAGNOSIS — M25.811 SHOULDER IMPINGEMENT, RIGHT: ICD-10-CM

## 2025-04-29 DIAGNOSIS — M75.21 BICEPS TENDONITIS ON RIGHT: ICD-10-CM

## 2025-04-29 DIAGNOSIS — M75.81 RIGHT ROTATOR CUFF TENDONITIS: ICD-10-CM

## 2025-04-29 PROCEDURE — 97140 MANUAL THERAPY 1/> REGIONS: CPT | Mod: GP | Performed by: PHYSICAL THERAPIST

## 2025-04-29 PROCEDURE — 97110 THERAPEUTIC EXERCISES: CPT | Mod: GP | Performed by: PHYSICAL THERAPIST

## 2025-04-29 NOTE — PROGRESS NOTES
"    Physical Therapy Treatment    Patient Name: Dandre Bangura  MRN: [id number]  Encounter date:  4/29/2025  Time Calculation  Start Time: 1300  Stop Time: 1400  Time Calculation (min): 60 min     PT Therapeutic Procedures Time Entry  Manual Therapy Time Entry: 10  Therapeutic Exercise Time Entry: 45       Visit Number:  4 (including evaluation)  Planned total visits: 12  Visits Authorized/Insurance Coverage:  25 INS    Current Problem  Problem List Items Addressed This Visit    None  Visit Diagnoses         Codes      Shoulder impingement, right     M25.811      Biceps tendonitis on right     M75.21      Right rotator cuff tendonitis     M75.81            Surgery  R biceps tenodesis    Surgery date:  3/31/2025    Precautions   No resisted elbow flexion until at least May 12th     Pain   1/10    Subjective  General        The patient reports good tolerance to the last session.  He states that he has been a little more sore this week due to doing more with his arm.    Objective  Incisional adhesions palpated  Pain with internal rotation stretch     Treatment:   Ube NO RESISTANCE 2' F/B ea  Pulleys flexion and scaption 2' ea  3 way biceps AROM x20 ea  Scapular retraction 3\" 2x10  Wall slides flexion and abduction x10 3\" hold  Stand shoulder flexion, scaption and abduction x15 ea  Tricep kickback x15  Prone shoulder extension x15  Prone horizontal abduction x15  Prone row x15  Supine serratus punch x15  Side lying ER x15  Supine 90/90 IR/ER  Posterior capsule stretch 3x 30\"  Internal rotation towel stretch- pain therefore stopped  Sleeper stretch- 3x30\"    Manual:  PROM to the R shoulder and elbow all planes.  Gently grade 2 posterior shoulder mobs.  STM to the R shoulder and biceps.             Has patient been compliant with HEP? Yes           Assessment:     Pt's response to treatment:  The patient tolerated today's session well.  He is progressing well and compliant with HEP.  HEP updated  Pain end of session: " unchanged    Plan:     Continue with current POC/no changes.  FOLLOW PROTOCOL    Assessment of current progress against goals:  Progressing towards goals, dysfunction remains    Goals:   Patient will be independent with progression of home exercise program   Patient will demonstrate improved affected GHJ AROM to 160 degrees flexion, 160 degrees abduction, 55 degrees internal rotation, 70 degrees external rotation or greater to decrease difficulty with dressing, personal hygiene, and household chores  Patient to have full elbow flexion and extension to allow for dressing and personal hygiene without difficulty  4.  Patient will demonstrate increased affected UE strength to 4+/5 all planes to improve dynamic stability of elbow and GHJ to engage in heavy household chores and seasonal yardwork safely   5.  SPADI less than or equal to 10% to demonstrate improvement in functional activities

## 2025-05-06 ENCOUNTER — TREATMENT (OUTPATIENT)
Dept: PHYSICAL THERAPY | Facility: CLINIC | Age: 40
End: 2025-05-06
Payer: COMMERCIAL

## 2025-05-06 DIAGNOSIS — M75.81 RIGHT ROTATOR CUFF TENDONITIS: ICD-10-CM

## 2025-05-06 DIAGNOSIS — M75.21 BICEPS TENDONITIS ON RIGHT: ICD-10-CM

## 2025-05-06 DIAGNOSIS — M25.811 SHOULDER IMPINGEMENT, RIGHT: ICD-10-CM

## 2025-05-06 PROCEDURE — 97110 THERAPEUTIC EXERCISES: CPT | Mod: GP | Performed by: PHYSICAL THERAPIST

## 2025-05-06 PROCEDURE — 97140 MANUAL THERAPY 1/> REGIONS: CPT | Mod: GP | Performed by: PHYSICAL THERAPIST

## 2025-05-06 NOTE — PROGRESS NOTES
"    Physical Therapy Treatment    Patient Name: Dandre Bangura  MRN: [id number]  Encounter date:  5/6/2025  Time Calculation  Start Time: 0908  Stop Time: 0948  Time Calculation (min): 40 min     PT Therapeutic Procedures Time Entry  Manual Therapy Time Entry: 8  Therapeutic Exercise Time Entry: 32       Visit Number:  5 (including evaluation)  Planned total visits: 12  Visits Authorized/Insurance Coverage:  25 INS    Current Problem  Problem List Items Addressed This Visit    None  Visit Diagnoses         Codes      Shoulder impingement, right     M25.811      Biceps tendonitis on right     M75.21      Right rotator cuff tendonitis     M75.81            Surgery  R biceps tenodesis    Surgery date:  3/31/2025    Precautions   No resisted elbow flexion until at least May 12th     Pain   0/10    Subjective  General        Doesn't wear sling any longer;  sees  on Friday for 6 week follow up.  Doesn't feel comfortable doing IR stretch with towel as it feels more noxious not stretching.  Trying to not use arm as much so pain is down.  Most often gets pain when rolling in bed and bring pillow or covers with him.      Objective  Standing:  Glut medius internal rotation, 155 flexion, 25 Er in sitting;    Supine:  159 supine flexion, 55 ER in supine 60 abduction, 55 IR  60 abduction  Mild tightness at pec    Treatment:  Therapeutic Exercise:     Ube NO RESISTANCE 2' F/B ea  Pulleys flexion and scaption 2' ea  3 way biceps AROM x20 ea  Scapular retraction 3\" 2x10  Wall slides flexion and abduction x10 3\" hold  Stand shoulder flexion, scaption and abduction x15 ea to shoulder height  Tricep kickback x15 prone  Prone shoulder extension x15  Prone horizontal abduction x15  Prone row x15  Side lying ER x15  Supine 90/90 IR/ER  Did not perform 5/6/25  Supine serratus punch x15 -   Posterior capsule stretch 3x 30\"  Internal rotation towel stretch- pain therefore stopped  Sleeper stretch- 3x30\"    Manual:  PROM to the R shoulder " and elbow all planes.  Gently grade 2 posterior shoulder mobs.       Has patient been compliant with HEP? Yes     Assessment:     Pt's response to treatment:  Increasing in ROM from evaluation.  Remains limited in IR and ER.  Patient to see physician on Friday and obtain update.      Pain end of session: unchanged    Plan:     Continue with current POC/no changes.  FOLLOW PROTOCOL    Assessment of current progress against goals:  Progressing towards goals, dysfunction remains    Goals:   Patient will be independent with progression of home exercise program   Patient will demonstrate improved affected GHJ AROM to 160 degrees flexion, 160 degrees abduction, 55 degrees internal rotation, 70 degrees external rotation or greater to decrease difficulty with dressing, personal hygiene, and household chores  Patient to have full elbow flexion and extension to allow for dressing and personal hygiene without difficulty  4.  Patient will demonstrate increased affected UE strength to 4+/5 all planes to improve dynamic stability of elbow and GHJ to engage in heavy household chores and seasonal yardwork safely   5.  SPADI less than or equal to 10% to demonstrate improvement in functional activities

## 2025-05-09 ENCOUNTER — OFFICE VISIT (OUTPATIENT)
Dept: ORTHOPEDIC SURGERY | Facility: HOSPITAL | Age: 40
End: 2025-05-09
Payer: COMMERCIAL

## 2025-05-09 DIAGNOSIS — Z98.890 STATUS POST SURGERY: Primary | ICD-10-CM

## 2025-05-09 PROCEDURE — 1036F TOBACCO NON-USER: CPT | Performed by: STUDENT IN AN ORGANIZED HEALTH CARE EDUCATION/TRAINING PROGRAM

## 2025-05-09 PROCEDURE — 99211 OFF/OP EST MAY X REQ PHY/QHP: CPT | Performed by: STUDENT IN AN ORGANIZED HEALTH CARE EDUCATION/TRAINING PROGRAM

## 2025-05-09 ASSESSMENT — PAIN DESCRIPTION - DESCRIPTORS: DESCRIPTORS: SORE

## 2025-05-09 ASSESSMENT — PAIN - FUNCTIONAL ASSESSMENT: PAIN_FUNCTIONAL_ASSESSMENT: 0-10

## 2025-05-09 ASSESSMENT — PAIN SCALES - GENERAL: PAINLEVEL_OUTOF10: 1

## 2025-05-09 NOTE — PROGRESS NOTES
PRIMARY CARE PHYSICIAN: JIA Perera-CNP    ORTHOPAEDIC POSTOPERATIVE VISIT    ASSESSMENT & PLAN    Impression: 40 y.o. male 5 weeks and 4 days postop s/p right shoulder subacromial decompression with subacromial biceps tenodesis.  Overall, the patient is recovering well.    Plan:   He will begin resisted elbow exercises at this time.  He will focus on range of motion and strengthening as per protocol.    I reviewed the intraoperative findings with the patient and answered all their questions. I reviewed their postoperative timeline and plan with them. They understand the postoperative precautions and the treatment plan going forward.     Follow-Up: Patient will follow-up in 6-8 weeks.    At the end of the visit, all questions were answered in full. The patient is in agreement with the plan and recommendations. They will call the office with any questions/concerns.      Note dictated with [employer] software. Completed without full typed error editing and sent to avoid delay.      SUBJECTIVE  CHIEF COMPLAINT:   Chief Complaint   Patient presents with    Right Shoulder - Post-op     Sx 3/31/25  R shoulder decompression        HPI: Dandre Bangura is a 40 y.o. patient. Dandre Bangura is now postop status post right shoulder arthroscopic subacromial decompression with subpectoral biceps tenodesis.  Overall, the patient is recovering well.  He has no significant pain and is not taking any narcotic medication.  He has some mild pain at night and with other activities which is significantly improved since previous evaluation.  No reports of distal numbness or tingling.  There are no reported issues with his surgical incisions.  He continues with PT as per protocol.  He is back at work.    REVIEW OF SYSTEMS  Constitutional: See HPI for pain assessment, No significant weight loss, recent trauma  Cardiovascular: No chest pain, shortness of breath  Respiratory: No difficulty breathing,  cough  Gastrointestinal: No nausea, vomiting, diarrhea, constipation  Musculoskeletal: Noted in HPI, positive for pain, restricted motion, stiffness  Integumentary: No rashes, easy bruising, redness   Neurological: no numbness or tingling in extremities, no gait disturbances   Psychiatric: No mood changes, memory changes, social issues  Heme/Lymph: no excessive swelling, easy bruising, excessive bleeding  ENT: No hearing changes  Eyes: No vision changes    CURRENT MEDICATIONS:   Current Medications[1]     OBJECTIVE    PHYSICAL EXAM      3/31/2025     5:42 AM 3/31/2025     5:50 AM 3/31/2025     7:00 AM 3/31/2025     9:26 AM 3/31/2025     9:40 AM 3/31/2025     9:49 AM 3/31/2025    10:00 AM   Vitals   Systolic  147 150 134 136 134 140   Diastolic  99 97 81 87 85 88   Heart Rate  64 75 57 70 65 67   Temp  36.6 °C (97.9 °F)  36 °C (96.8 °F)  36.2 °C (97.2 °F) 36.2 °C (97.2 °F)   Resp  16 13 14 14 14 16   Weight (lb) 191.58         BMI 28.29 kg/m2         BSA (m2) 2.06 m2            There is no height or weight on file to calculate BMI.    General: Well-appearing, no acute distress    Skin intact bilateral upper and lower extremities  No erythema  No warmth    Detailed examination of the right shoulder demonstrates:  Surgical incision(s) healing wel.  No erythema or warmth  No drainage  No ecchymosis  Biceps contour normal  Shoulder range of motion: Forward flexion 150, abduction 140, external rotation 30 degrees.  5/5 strength of the supraspinatus, infraspinatus, subscapularis.  Upper extremity motor grossly intact  C5-T1 sensation intact bilaterally  2+ radial pulses bilaterally  Warm and well-perfused, brisk capillary refill    IMAGING:   No new imaging    Seng Boothe MD, MPH  Attending Surgeon    Sports Medicine Orthopaedic Surgery  [facility] [employer]        [1]   Current Outpatient Medications    Medication Sig Dispense Refill    chlorhexidine (Peridex) 0.12 % solution Use cap to measure 15 mL.  Swish/gargle mouthwash for at least 30 seconds.  Do not swallow.  Use night before surgery after brushing teeth and morning of surgery after brushing teeth. 473 mL 0    EPINEPHrine (Epipen) 0.3 mg/0.3 mL injection syringe Inject 0.3 mL (0.3 mg) into the shoulder, thigh, or buttocks 1 time if needed for anaphylaxis for up to 1 dose. Call 911 after use. 1 each 0    fluticasone (Flonase Allergy Relief) 50 mcg/actuation nasal spray Administer into affected nostril(s).      losartan (Cozaar) 25 mg tablet Take 1 tablet (25 mg) by mouth once daily. 90 tablet 3    meloxicam (Mobic) 15 mg tablet TAKE 1 TABLET BY MOUTH EVERY DAY 30 tablet 1     No current facility-administered medications for this visit.

## 2025-05-15 ENCOUNTER — TREATMENT (OUTPATIENT)
Dept: PHYSICAL THERAPY | Facility: CLINIC | Age: 40
End: 2025-05-15
Payer: COMMERCIAL

## 2025-05-15 DIAGNOSIS — M75.21 BICEPS TENDONITIS ON RIGHT: ICD-10-CM

## 2025-05-15 DIAGNOSIS — M25.811 SHOULDER IMPINGEMENT, RIGHT: ICD-10-CM

## 2025-05-15 DIAGNOSIS — M75.81 RIGHT ROTATOR CUFF TENDONITIS: ICD-10-CM

## 2025-05-15 PROCEDURE — 97110 THERAPEUTIC EXERCISES: CPT | Mod: GP | Performed by: PHYSICAL THERAPIST

## 2025-05-15 NOTE — PROGRESS NOTES
"    Physical Therapy Treatment    Patient Name: Dandre Bangura  MRN: [id number]  Encounter date:  5/15/2025  Time Calculation  Start Time: 0800  Stop Time: 0848  Time Calculation (min): 48 min     PT Therapeutic Procedures Time Entry  Therapeutic Exercise Time Entry: 44       Visit Number:  6 (including evaluation)  Planned total visits: 12  Visits Authorized/Insurance Coverage:  25 INS    Current Problem  Problem List Items Addressed This Visit    None  Visit Diagnoses         Codes      Shoulder impingement, right     M25.811      Biceps tendonitis on right     M75.21      Right rotator cuff tendonitis     M75.81            Surgery  R biceps tenodesis    Surgery date:  3/31/2025    Precautions   Follow protocol    Pain   0/10    Subjective  General        The patient reports good tolerance to the last session.  He states that he saw the doctor last week and has been clear to begin strengthening.      Objective  Standing:  Glut medius internal rotation, 155 flexion, 25 Er in sitting;    Supine:  159 supine flexion, 55 ER in supine 60 abduction, 55 IR  60 abduction      Treatment:  Therapeutic Exercise:     Ube  2' F/B ea  Pulleys flexion and scaption and IR 2' ea  Bicep curl 1# 2x10    Orange tband 2x10:  Row  Shoulder extension  Shoulder IR   Shoulder ER    Stand shoulder flexion, scaption and abduction 1# 2x10 ea to shoulder height    1# 2x10 ea:  Prone shoulder flexion  Prone shoulder extension   Prone horizontal abduction   Prone row x15      Side lying ER 1# 2x10  Supine 90/90 IR/ER  Supine serratus punch 1# 2x10    Posterior capsule stretch 3x 30\"  Sleeper stretch- 3x30\"         Has patient been compliant with HEP? Yes     Assessment:     Pt's response to treatment: The patient toleated today's session well and reports no increased s/s with the upgrades performed.  HEP updated.  Advised the patient to ice to decrease inflammation    Pain end of session: unchanged    Plan:     Continue with current POC/no " changes.  FOLLOW PROTOCOL    Assessment of current progress against goals:  Progressing towards goals, dysfunction remains    Goals:   Patient will be independent with progression of home exercise program   Patient will demonstrate improved affected GHJ AROM to 160 degrees flexion, 160 degrees abduction, 55 degrees internal rotation, 70 degrees external rotation or greater to decrease difficulty with dressing, personal hygiene, and household chores  Patient to have full elbow flexion and extension to allow for dressing and personal hygiene without difficulty  4.  Patient will demonstrate increased affected UE strength to 4+/5 all planes to improve dynamic stability of elbow and GHJ to engage in heavy household chores and seasonal yardwork safely   5.  SPADI less than or equal to 10% to demonstrate improvement in functional activities

## 2025-05-20 ENCOUNTER — TREATMENT (OUTPATIENT)
Dept: PHYSICAL THERAPY | Facility: CLINIC | Age: 40
End: 2025-05-20
Payer: COMMERCIAL

## 2025-05-20 DIAGNOSIS — M75.21 BICEPS TENDONITIS ON RIGHT: ICD-10-CM

## 2025-05-20 DIAGNOSIS — M25.811 SHOULDER IMPINGEMENT, RIGHT: ICD-10-CM

## 2025-05-20 DIAGNOSIS — M75.81 RIGHT ROTATOR CUFF TENDONITIS: ICD-10-CM

## 2025-05-20 PROCEDURE — 97110 THERAPEUTIC EXERCISES: CPT | Mod: GP | Performed by: PHYSICAL THERAPIST

## 2025-05-20 NOTE — PROGRESS NOTES
"    Physical Therapy Treatment    Patient Name: Dandre Bangura  MRN: [id number]  Encounter date:  5/20/2025  Time Calculation  Start Time: 0805  Stop Time: 0850  Time Calculation (min): 45 min     PT Therapeutic Procedures Time Entry  Therapeutic Exercise Time Entry: 44       Visit Number:  7 (including evaluation)  Planned total visits: 12  Visits Authorized/Insurance Coverage:  25 INS    Current Problem  Problem List Items Addressed This Visit    None  Visit Diagnoses         Codes      Shoulder impingement, right     M25.811      Biceps tendonitis on right     M75.21      Right rotator cuff tendonitis     M75.81            Surgery  R biceps tenodesis    Surgery date:  3/31/2025    Precautions   Follow protocol    Pain   0/10    Subjective  General        The patient reports good tolerance to the last session and that he has been doing 10# dumbbell curls at home with no pain    Objective  Patient still most limited into internal rotation      Treatment:  Therapeutic Exercise:     Ube  2.5' F/B ea level 2  Pulleys flexion and IR 2' ea    Wall push up with ball 2x10  Ball on wall stab up/down, left/right, cw/ccw x10 ea  Bicep curl 1# 2x10    Theraband blue 2x10:  Row  Shoulder extension  Shoulder IR   Shoulder ER    Stand shoulder flexion, scaption and abduction 3# 2x10 ea to shoulder height    2x10 ea:  Prone shoulder flexion 3#  Prone shoulder extension 3#  Prone horizontal abduction 3#  Prone row 5#      Side lying ER 4# 2x10  Supine 90/90 IR/ER 3# 2x10  Supine serratus punch 3# 2x10    Posterior capsule stretch 3x 30\"-DNP  Sleeper stretch- 3x30\"         Has patient been compliant with HEP? Yes     Assessment:     Pt's response to treatment: The patient toleated the upgrades of today's well with no co increased s/s  Pain end of session: unchanged    Plan:     Continue with current POC/no changes.  FOLLOW PROTOCOL    Assessment of current progress against goals:  Progressing towards goals, dysfunction " remains    Goals:   Patient will be independent with progression of home exercise program   Patient will demonstrate improved affected GHJ AROM to 160 degrees flexion, 160 degrees abduction, 55 degrees internal rotation, 70 degrees external rotation or greater to decrease difficulty with dressing, personal hygiene, and household chores  Patient to have full elbow flexion and extension to allow for dressing and personal hygiene without difficulty  4.  Patient will demonstrate increased affected UE strength to 4+/5 all planes to improve dynamic stability of elbow and GHJ to engage in heavy household chores and seasonal yardwork safely   5.  SPADI less than or equal to 10% to demonstrate improvement in functional activities

## 2025-05-27 ENCOUNTER — TREATMENT (OUTPATIENT)
Dept: PHYSICAL THERAPY | Facility: CLINIC | Age: 40
End: 2025-05-27
Payer: COMMERCIAL

## 2025-05-27 DIAGNOSIS — M75.81 RIGHT ROTATOR CUFF TENDONITIS: ICD-10-CM

## 2025-05-27 DIAGNOSIS — M25.811 SHOULDER IMPINGEMENT, RIGHT: ICD-10-CM

## 2025-05-27 DIAGNOSIS — M75.21 BICEPS TENDONITIS ON RIGHT: ICD-10-CM

## 2025-05-27 PROCEDURE — 97110 THERAPEUTIC EXERCISES: CPT | Mod: GP | Performed by: PHYSICAL THERAPIST

## 2025-05-27 NOTE — PROGRESS NOTES
"    Physical Therapy Treatment    Patient Name: Dandre Bangura  MRN: [id number]  Encounter date:  5/27/2025  Time Calculation  Start Time: 0800  Stop Time: 0850  Time Calculation (min): 50 min     PT Therapeutic Procedures Time Entry  Therapeutic Exercise Time Entry: 48       Visit Number:  8 (including evaluation)  Planned total visits: 12  Visits Authorized/Insurance Coverage:  25 INS    Current Problem  Problem List Items Addressed This Visit    None  Visit Diagnoses         Codes      Shoulder impingement, right     M25.811      Biceps tendonitis on right     M75.21      Right rotator cuff tendonitis     M75.81            Surgery  R biceps tenodesis    Surgery date:  3/31/2025    Precautions   Follow protocol    Pain   0/10    Subjective  General        The patient reports good tolerance to the last session and compliance with HEP    Objective  Patient still most limited into internal rotation      Treatment:  Therapeutic Exercise:     Ube  2.5' F/B ea level 3  Pulleys flexion and IR 2' ea    Wall push up with ball 2x15  Ball on wall stab up/down, left/right, cw/ccw x15 ea  Cybex Bicep curl 20# 2x15  Cybex lat pulldown reverse  50# 2x10        Stand shoulder flexion, scaption and abduction 3# 2x15 ea to shoulder height    2x15 ea:  Prone shoulder flexion 3#-DNP  Prone shoulder extension 4#  Prone horizontal abduction 4#  Prone row 15#      Side lying ER 4# 2x15  Supine 90/90 IR/ER 4# 2x15  Supine serratus punch 8# 2x15    Posterior capsule stretch 3x 30\"-DNP  Sleeper stretch- 3x30\"    Performed via HEP:  Theraband blue 2x10:  Row  Shoulder extension  Shoulder IR   Shoulder ER     Has patient been compliant with HEP? Yes     Assessment:     Pt's response to treatment: The patient toleated the upgrades of today's session well.  Utilized cybex machines with no co increased s/s  Pain end of session: unchanged    Plan:     Continue with current POC/no changes.  FOLLOW PROTOCOL    Assessment of current progress " against goals:  Progressing towards goals, dysfunction remains    Goals:   Patient will be independent with progression of home exercise program   Patient will demonstrate improved affected GHJ AROM to 160 degrees flexion, 160 degrees abduction, 55 degrees internal rotation, 70 degrees external rotation or greater to decrease difficulty with dressing, personal hygiene, and household chores  Patient to have full elbow flexion and extension to allow for dressing and personal hygiene without difficulty  4.  Patient will demonstrate increased affected UE strength to 4+/5 all planes to improve dynamic stability of elbow and GHJ to engage in heavy household chores and seasonal yardwork safely   5.  SPADI less than or equal to 10% to demonstrate improvement in functional activities

## 2025-06-03 ENCOUNTER — TREATMENT (OUTPATIENT)
Dept: PHYSICAL THERAPY | Facility: CLINIC | Age: 40
End: 2025-06-03
Payer: COMMERCIAL

## 2025-06-03 DIAGNOSIS — M75.81 RIGHT ROTATOR CUFF TENDONITIS: ICD-10-CM

## 2025-06-03 DIAGNOSIS — M25.811 SHOULDER IMPINGEMENT, RIGHT: ICD-10-CM

## 2025-06-03 DIAGNOSIS — M75.21 BICEPS TENDONITIS ON RIGHT: ICD-10-CM

## 2025-06-03 PROCEDURE — 97110 THERAPEUTIC EXERCISES: CPT | Mod: GP | Performed by: PHYSICAL THERAPIST

## 2025-06-03 NOTE — PROGRESS NOTES
"    Physical Therapy Treatment    Patient Name: Dandre Bangura  MRN: [id number]  Encounter date:  6/3/2025  Time Calculation  Start Time: 0805  Stop Time: 0849  Time Calculation (min): 44 min     PT Therapeutic Procedures Time Entry  Therapeutic Exercise Time Entry: 44       Visit Number:  9 (including evaluation)  Planned total visits: 12  Visits Authorized/Insurance Coverage:  25 INS    Current Problem  Problem List Items Addressed This Visit    None  Visit Diagnoses         Codes      Shoulder impingement, right     M25.811      Biceps tendonitis on right     M75.21      Right rotator cuff tendonitis     M75.81            Surgery  R biceps tenodesis    Surgery date:  3/31/2025    Precautions   Follow protocol    Pain   0/10    Subjective  General        The patient reports good tolerance to the last session and compliance with HEP    Objective  Tight pec insertion and limited IR/ER ROM  No pain with any strengthening activities      Treatment:  Therapeutic Exercise:     Ube  2.5' F/B ea level 3  Pulleys flexion and IR 2' ea    Wall push up + with ball 2x15  Ball on wall stab up/down, left/right, cw/ccw x15 ea  Cybex Bicep curl 50# 2x10  Cybex lat pulldown  regular and reverse  70# 2x10 ea  Tricep on cable column 35# 2x10  Row 50# 2x10  Plank x30\"  Plank up downs x5      Stand shoulder flexion, scaption 5# x15 ea to shoulder height abduction 4# x15    2x15 ea:  Prone shoulder flexion 3#-DNP  Prone shoulder extension 4#  Prone horizontal abduction 4#  Prone row 15#      Side lying ER 4# 2x15  Supine 90/90 IR/ER 4# 2x15  Supine serratus punch 8# 2x15    Posterior capsule stretch 3x 30\"-DNP  Sleeper stretch- 3x30\"    Performed via HEP:  Theraband blue 2x10:  Row  Shoulder extension  Shoulder IR   Shoulder ER     Has patient been compliant with HEP? Yes     Assessment:     Pt's response to treatment: The patient toleated the upgrades of today's session well with no symptoms.  He would like for today to be his last " day as this therapist is leaving the practice and he feels that he can continue via HEP at the gym.  He does continue to have some shoulder tightness especially pec insertion and internal and external rotation  Pain end of session: unchanged    Plan:     Hold chart x 30 days.  Patient has MD follow up on June 27th.  If no issues will DC.  Transfer POC to [facility].    Assessment of current progress against goals:  Progressing towards goals, dysfunction remains    Goals:   Patient will be independent with progression of home exercise program -MET  Patient will demonstrate improved affected GHJ AROM to 160 degrees flexion, 160 degrees abduction, 55 degrees internal rotation, 70 degrees external rotation or greater to decrease difficulty with dressing, personal hygiene, and household chores  Patient to have full elbow flexion and extension to allow for dressing and personal hygiene without difficulty-partially met  4.  Patient will demonstrate increased affected UE strength to 4+/5 all planes to improve dynamic stability of elbow and GHJ to engage in heavy household chores and seasonal yardwork safely -MET  5.  SPADI less than or equal to 10% to demonstrate improvement in functional activities -MET

## 2025-06-10 ENCOUNTER — APPOINTMENT (OUTPATIENT)
Dept: PHYSICAL THERAPY | Facility: CLINIC | Age: 40
End: 2025-06-10
Payer: COMMERCIAL

## 2025-06-17 ENCOUNTER — APPOINTMENT (OUTPATIENT)
Dept: PHYSICAL THERAPY | Facility: CLINIC | Age: 40
End: 2025-06-17
Payer: COMMERCIAL

## 2025-06-20 ENCOUNTER — APPOINTMENT (OUTPATIENT)
Dept: PHYSICAL THERAPY | Facility: CLINIC | Age: 40
End: 2025-06-20
Payer: COMMERCIAL

## 2025-06-27 ENCOUNTER — OFFICE VISIT (OUTPATIENT)
Dept: ORTHOPEDIC SURGERY | Facility: HOSPITAL | Age: 40
End: 2025-06-27
Payer: COMMERCIAL

## 2025-06-27 VITALS — WEIGHT: 191 LBS | HEIGHT: 69 IN | BODY MASS INDEX: 28.29 KG/M2

## 2025-06-27 DIAGNOSIS — Z98.890 STATUS POST SURGERY: Primary | ICD-10-CM

## 2025-06-27 PROCEDURE — 3008F BODY MASS INDEX DOCD: CPT | Performed by: STUDENT IN AN ORGANIZED HEALTH CARE EDUCATION/TRAINING PROGRAM

## 2025-06-27 PROCEDURE — 99024 POSTOP FOLLOW-UP VISIT: CPT | Performed by: STUDENT IN AN ORGANIZED HEALTH CARE EDUCATION/TRAINING PROGRAM

## 2025-06-27 PROCEDURE — 99212 OFFICE O/P EST SF 10 MIN: CPT

## 2025-06-27 ASSESSMENT — PAIN SCALES - GENERAL: PAINLEVEL_OUTOF10: 1

## 2025-06-27 ASSESSMENT — PAIN DESCRIPTION - DESCRIPTORS: DESCRIPTORS: ACHING

## 2025-06-27 ASSESSMENT — PAIN - FUNCTIONAL ASSESSMENT: PAIN_FUNCTIONAL_ASSESSMENT: 0-10

## 2025-06-27 NOTE — PROGRESS NOTES
PRIMARY CARE PHYSICIAN: JIA Perera-CNP    ORTHOPAEDIC POSTOPERATIVE VISIT    ASSESSMENT & PLAN    Impression: 40 y.o. male 12 weeks and 4 days postop s/p right shoulder subacromial decompression with subacromial biceps tenodesis. The patient is making a fantastic recovery.     Plan:   He will continue strengthening exercises at this time. He will start with low weights and gradually increase the weight with time.     I reviewed the intraoperative findings with the patient and answered all their questions. I reviewed their postoperative timeline and plan with them. They understand the postoperative precautions and the treatment plan going forward.     Follow-Up: Patient will follow-up in 8 to 10 weeks    At the end of the visit, all questions were answered in full. The patient is in agreement with the plan and recommendations. They will call the office with any questions/concerns.      Note dictated with Other Machine software. Completed without full typed error editing and sent to avoid delay.      SUBJECTIVE  CHIEF COMPLAINT:   Chief Complaint   Patient presents with    Right Shoulder - Post-op     Sx 3/31/25  R shoulder decompression        HPI: Dandre Bangura is a 40 y.o. patient. Dandre Bangura is now postop s/p right shoulder subacromial decompression with subacromial biceps tenodesis. The patient is making a fantastic recovery. He has no significant pain and is not taking any narcotic medications. He has some mild pain while sleeping and with lateral raises; however, the pain has been reduced since previous evaluations. He does not report any distal numbness or tingling. There are no reported issues with his surgical incisions. He is back at work, exercising with strength training, playing catch with his daughter, and doing activities that he enjoys.     REVIEW OF SYSTEMS  Constitutional: See HPI for pain assessment, No significant weight loss, recent trauma  Cardiovascular: No  "chest pain, shortness of breath  Respiratory: No difficulty breathing, cough  Gastrointestinal: No nausea, vomiting, diarrhea, constipation  Musculoskeletal: Noted in HPI, positive for pain, restricted motion, stiffness  Integumentary: No rashes, easy bruising, redness   Neurological: no numbness or tingling in extremities, no gait disturbances   Psychiatric: No mood changes, memory changes, social issues  Heme/Lymph: no excessive swelling, easy bruising, excessive bleeding  ENT: No hearing changes  Eyes: No vision changes    CURRENT MEDICATIONS:   Current Medications[1]     OBJECTIVE    PHYSICAL EXAM      3/31/2025     5:50 AM 3/31/2025     7:00 AM 3/31/2025     9:26 AM 3/31/2025     9:40 AM 3/31/2025     9:49 AM 3/31/2025    10:00 AM 6/27/2025     9:25 AM   Vitals   Systolic 147 150 134 136 134 140    Diastolic 99 97 81 87 85 88    Heart Rate 64 75 57 70 65 67    Temp 36.6 °C (97.9 °F)  36 °C (96.8 °F)  36.2 °C (97.2 °F) 36.2 °C (97.2 °F)    Resp 16 13 14 14 14 16    Height       1.753 m (5' 9\")   Weight (lb)       191   BMI       28.21 kg/m2   BSA (m2)       2.05 m2   Visit Report       Report      Body mass index is 28.21 kg/m².    General: Well-appearing, no acute distress    Skin intact bilateral upper and lower extremities  No erythema  No warmth    Detailed examination of the right shoulder demonstrates:  Surgical incision(s) healing well  No erythema or warmth  No drainage  No ecchymosis  Biceps contour normal  Shoulder range of motion: Forward flexion 180, abduction 180, external rotation 90, internal rotation 90 degrees.  5/5 strength of the supraspinatus, infraspinatus, subscapularis, deltoid, biceps, and triceps  Upper extremity motor grossly intact  C5-T1 sensation intact bilaterally  2+ radial pulses bilaterally  Warm and well-perfused, brisk capillary refill    IMAGING:   No new imaging    Seng Boothe MD, MPH  Attending Surgeon    Sports Medicine Orthopaedic " Surgery  [facility] [place]  [employer] [employer]          [1]   Current Outpatient Medications   Medication Sig Dispense Refill    chlorhexidine (Peridex) 0.12 % solution Use cap to measure 15 mL.  Swish/gargle mouthwash for at least 30 seconds.  Do not swallow.  Use night before surgery after brushing teeth and morning of surgery after brushing teeth. 473 mL 0    EPINEPHrine (Epipen) 0.3 mg/0.3 mL injection syringe Inject 0.3 mL (0.3 mg) into the shoulder, thigh, or buttocks 1 time if needed for anaphylaxis for up to 1 dose. Call 911 after use. 1 each 0    fluticasone (Flonase Allergy Relief) 50 mcg/actuation nasal spray Administer into affected nostril(s).      losartan (Cozaar) 25 mg tablet Take 1 tablet (25 mg) by mouth once daily. 90 tablet 3    meloxicam (Mobic) 15 mg tablet TAKE 1 TABLET BY MOUTH EVERY DAY 30 tablet 1     No current facility-administered medications for this visit.

## 2025-08-18 ENCOUNTER — APPOINTMENT (OUTPATIENT)
Dept: SURGERY | Facility: CLINIC | Age: 40
End: 2025-08-18
Payer: COMMERCIAL

## 2025-08-18 VITALS
WEIGHT: 196 LBS | HEART RATE: 71 BPM | SYSTOLIC BLOOD PRESSURE: 151 MMHG | DIASTOLIC BLOOD PRESSURE: 87 MMHG | BODY MASS INDEX: 28.94 KG/M2 | TEMPERATURE: 98.1 F

## 2025-08-18 DIAGNOSIS — L72.3 SEBACEOUS CYST: Primary | ICD-10-CM

## 2025-08-18 PROCEDURE — 99202 OFFICE O/P NEW SF 15 MIN: CPT | Performed by: SURGERY

## 2025-08-18 PROCEDURE — 3077F SYST BP >= 140 MM HG: CPT | Performed by: SURGERY

## 2025-08-18 PROCEDURE — 3079F DIAST BP 80-89 MM HG: CPT | Performed by: SURGERY

## 2025-08-22 ENCOUNTER — OFFICE VISIT (OUTPATIENT)
Dept: ORTHOPEDIC SURGERY | Facility: HOSPITAL | Age: 40
End: 2025-08-22
Payer: COMMERCIAL

## 2025-08-22 ASSESSMENT — PAIN SCALES - GENERAL: PAINLEVEL_OUTOF10: 0 - NO PAIN

## 2025-08-22 ASSESSMENT — PAIN - FUNCTIONAL ASSESSMENT: PAIN_FUNCTIONAL_ASSESSMENT: 0-10

## 2025-10-14 ENCOUNTER — APPOINTMENT (OUTPATIENT)
Dept: PRIMARY CARE | Facility: CLINIC | Age: 40
End: 2025-10-14
Payer: COMMERCIAL

## 2025-10-21 ENCOUNTER — APPOINTMENT (OUTPATIENT)
Dept: PRIMARY CARE | Facility: CLINIC | Age: 40
End: 2025-10-21
Payer: COMMERCIAL

## (undated) DEVICE — Device

## (undated) DEVICE — GLOVE, SURGICAL, PROTEXIS PI BLUE W/NEUTHERA, 8.5, PF, LF

## (undated) DEVICE — DRAPE, INCISE, ANTIMICROBIAL, IOBAN 2, 13 X 13 IN, DISPOSABLE, STERILE

## (undated) DEVICE — MASK, FACE, TENET, FOAM POSITIONING, DISPOSABLE

## (undated) DEVICE — SUTURE, VICRYL, 0, 27 IN, CT-2, UNDYED

## (undated) DEVICE — SUTURE, MONOCRYL, 3-0, 18 IN, PS2, UNDYED

## (undated) DEVICE — TUBING, PATIENT 8FT STERILE

## (undated) DEVICE — GOWN, SMARTSLEEVE, XXL, X-LONG

## (undated) DEVICE — BLOCK, FOAM, NEEDLE POP -N-COUNT, 1840, BLACK

## (undated) DEVICE — EXCAL 4MM X 13CM SINGLE

## (undated) DEVICE — GLOVE, PROTEXIS PI CLASSIC, SZ-8.0, PF, PF, LF

## (undated) DEVICE — PROBE, APOLLO RF, 90 DEG, EXTRA LARTGE

## (undated) DEVICE — ADHESIVE, SKIN, DERMABOND ADVANCED, 15CM, PEN-STYLE

## (undated) DEVICE — SOLUTION, IRRIGATION, USP, SODIUM CHLORIDE 0.9%, 3000 ML

## (undated) DEVICE — DRAPE, INSTRUMENT, W/POUCH, STERI DRAPE, 7 X 11 IN, DISPOSABLE, STERILE

## (undated) DEVICE — TUBING, PUMP REDEUCE 8FT STERILE

## (undated) DEVICE — DRESSING, MOISTURE VAPOR PERMEABLE, TEGADERM, 2X2-3/4IN, TRANSPARENT

## (undated) DEVICE — CANNULA, ARTHRO LOW PROFILE 5MM ID X 7CM

## (undated) DEVICE — DRAPE, SHEET, U, STERI DRAPE, 47 X 51 IN, DISPOSABLE, STERILE

## (undated) DEVICE — KIT, BEACH CHAIR TRIMANO